# Patient Record
Sex: FEMALE | Race: ASIAN | NOT HISPANIC OR LATINO
[De-identification: names, ages, dates, MRNs, and addresses within clinical notes are randomized per-mention and may not be internally consistent; named-entity substitution may affect disease eponyms.]

---

## 2021-06-21 PROBLEM — Z00.00 ENCOUNTER FOR PREVENTIVE HEALTH EXAMINATION: Status: ACTIVE | Noted: 2021-06-21

## 2021-06-22 ENCOUNTER — APPOINTMENT (OUTPATIENT)
Dept: RADIOLOGY | Facility: IMAGING CENTER | Age: 46
End: 2021-06-22

## 2021-06-22 ENCOUNTER — APPOINTMENT (OUTPATIENT)
Dept: UROGYNECOLOGY | Facility: CLINIC | Age: 46
End: 2021-06-22
Payer: MEDICAID

## 2021-06-22 ENCOUNTER — OUTPATIENT (OUTPATIENT)
Dept: OUTPATIENT SERVICES | Facility: HOSPITAL | Age: 46
LOS: 1 days | End: 2021-06-22

## 2021-06-22 ENCOUNTER — APPOINTMENT (OUTPATIENT)
Dept: MAMMOGRAPHY | Facility: IMAGING CENTER | Age: 46
End: 2021-06-22

## 2021-06-22 VITALS
SYSTOLIC BLOOD PRESSURE: 117 MMHG | BODY MASS INDEX: 31.8 KG/M2 | HEART RATE: 77 BPM | TEMPERATURE: 97.6 F | HEIGHT: 60 IN | DIASTOLIC BLOOD PRESSURE: 80 MMHG | WEIGHT: 162 LBS

## 2021-06-22 DIAGNOSIS — Z00.8 ENCOUNTER FOR OTHER GENERAL EXAMINATION: ICD-10-CM

## 2021-06-22 DIAGNOSIS — Z78.9 OTHER SPECIFIED HEALTH STATUS: ICD-10-CM

## 2021-06-22 DIAGNOSIS — R35.0 FREQUENCY OF MICTURITION: ICD-10-CM

## 2021-06-22 DIAGNOSIS — N39.46 MIXED INCONTINENCE: ICD-10-CM

## 2021-06-22 DIAGNOSIS — R39.11 HESITANCY OF MICTURITION: ICD-10-CM

## 2021-06-22 LAB
BILIRUB UR QL STRIP: NORMAL
CLARITY UR: CLEAR
COLLECTION METHOD: NORMAL
GLUCOSE UR-MCNC: NORMAL
HCG UR QL: 0.2 EU/DL
HGB UR QL STRIP.AUTO: NORMAL
KETONES UR-MCNC: NORMAL
LEUKOCYTE ESTERASE UR QL STRIP: NORMAL
NITRITE UR QL STRIP: NORMAL
PH UR STRIP: 6
PROT UR STRIP-MCNC: NORMAL
SP GR UR STRIP: 1

## 2021-06-22 PROCEDURE — 51725 SIMPLE CYSTOMETROGRAM: CPT

## 2021-06-22 PROCEDURE — 99205 OFFICE O/P NEW HI 60 MIN: CPT | Mod: 25

## 2021-06-22 PROCEDURE — 51736 URINE FLOW MEASUREMENT: CPT

## 2021-06-22 NOTE — HISTORY OF PRESENT ILLNESS
[FreeTextEntry1] : This is a 45-year-old woman, who has mild urgency, hesitancy. Her daughter translating  describes it as a problem, which only occurs 2-3 times per year. She is not describe significant retention pain or severe urgency, or any leakage. Mixed incontinence a was briefly described but as we got into detailed translation there is no leakage. \par \par Examination the general exam is normal. The external genitalia are normal. There is no evidence of pelvic organ prolapse. A urinalysis was obtained and was unrevealing. As the exam and urinalysis, noncontributory since the stomach was performed and demonstrated first sensation at 50 cc urgency at 150 cc capacity of 350 cc. Mild sensory urgency was noted, but no distinct bladder contractions were appreciated. Cough stress testing was negative\par \par My impression that this patient has extremely rare sensation of the urge to go to the bathroom and a few times a year and has not produced any urine at these were aspirated. She shows no signs or symptoms of hypertension at the present time, and I have reassured her and occasional sensation of the urge to void, as she described it, in my opinion, is not represent medical condition in need of treatment and should not be alarming.  It is clinically not of significant other to the patient. Signs and symptoms of significant urgency, frequency disorder, as well as signs and symptoms of straining or hesitancy WERE reviewed and the detailed parameters for which to come for additional intervention were reviewed.

## 2021-06-23 PROBLEM — Z78.9 NON-SMOKER: Status: ACTIVE | Noted: 2021-06-23

## 2021-11-22 ENCOUNTER — APPOINTMENT (OUTPATIENT)
Dept: CT IMAGING | Facility: IMAGING CENTER | Age: 46
End: 2021-11-22
Payer: MEDICAID

## 2021-11-22 ENCOUNTER — OUTPATIENT (OUTPATIENT)
Dept: OUTPATIENT SERVICES | Facility: HOSPITAL | Age: 46
LOS: 1 days | End: 2021-11-22
Payer: MEDICAID

## 2021-11-22 DIAGNOSIS — H90.A31 MIXED CONDUCTIVE AND SENSORINEURAL HEARING LOSS, UNILATERAL, RIGHT EAR WITH RESTRICTED HEARING ON THE CONTRALATERAL SIDE: ICD-10-CM

## 2021-11-22 PROCEDURE — 70480 CT ORBIT/EAR/FOSSA W/O DYE: CPT | Mod: 26

## 2021-11-22 PROCEDURE — 70480 CT ORBIT/EAR/FOSSA W/O DYE: CPT

## 2021-12-28 ENCOUNTER — EMERGENCY (EMERGENCY)
Facility: HOSPITAL | Age: 46
LOS: 1 days | Discharge: HOME | End: 2021-12-28
Attending: EMERGENCY MEDICINE | Admitting: EMERGENCY MEDICINE
Payer: MEDICAID

## 2021-12-28 VITALS
HEART RATE: 100 BPM | OXYGEN SATURATION: 100 % | SYSTOLIC BLOOD PRESSURE: 107 MMHG | TEMPERATURE: 99 F | RESPIRATION RATE: 18 BRPM | DIASTOLIC BLOOD PRESSURE: 75 MMHG

## 2021-12-28 PROCEDURE — 99282 EMERGENCY DEPT VISIT SF MDM: CPT

## 2021-12-28 NOTE — ED PROVIDER NOTE - NSFOLLOWUPINSTRUCTIONS_ED_ALL_ED_FT
Follow up with your Primary Medical Doctor.  Rest.  Drink plenty of fluids.  Take Tylenol 650mg orally every 6 hours as needed for fever.  Self quarantine as discussed based on CDC guidelines.  It is very important to be diligent about hand washing & hygiene to avoid spreading the illness to others.  Return to the ER for any persistent/worsening or new symptoms chest pain, shortness of breath, unable to eat/drink high fever or any concerning symptoms.  If you test positive: 10 DAY SELF-QUARANTINE PERIOD WAS RECOMMENDED TO YOU AS PART OF YOUR CARE:  FOR A 10 DAY PERIOD:    Stay inside your home as much as possible, avoiding public places or public interaction.     Do not go to work. If you do enter any public domain, at minimum wear a surgical mask at all times.     Even while indoors, attempt to remain isolated from other individuals such as family or friends, as much as possible.     Return to the emergency room for any symptoms such as worsening shortness of breath, significant worsening cough, high fevers despite antipyretics, or severe weakness/malaise

## 2021-12-28 NOTE — ED PROVIDER NOTE - OBJECTIVE STATEMENT
46 yof with  positive covid exposure at home from spouse. Source tested positive 2 days ago. Patient has no chest pain, SOB, cough or other concerns. Had headache earlier that resolved with OTC meds.

## 2021-12-28 NOTE — ED PROVIDER NOTE - PATIENT PORTAL LINK FT
You can access the FollowMyHealth Patient Portal offered by [facility] by registering at the following website: [email or web address]. By joining [email or web address]’s FollowMyHealth portal, you will also be able to view your health information using other applications (apps) compatible with our system.

## 2021-12-29 LAB — SARS-COV-2 RNA SPEC QL NAA+PROBE: DETECTED

## 2022-04-19 PROBLEM — Z78.9 OTHER SPECIFIED HEALTH STATUS: Chronic | Status: ACTIVE | Noted: 2021-12-28

## 2022-08-05 ENCOUNTER — APPOINTMENT (OUTPATIENT)
Dept: OTOLARYNGOLOGY | Facility: CLINIC | Age: 47
End: 2022-08-05

## 2022-10-11 ENCOUNTER — APPOINTMENT (OUTPATIENT)
Dept: PULMONOLOGY | Facility: CLINIC | Age: 47
End: 2022-10-11

## 2022-10-11 ENCOUNTER — NON-APPOINTMENT (OUTPATIENT)
Age: 47
End: 2022-10-11

## 2022-10-11 VITALS — HEART RATE: 63 BPM | DIASTOLIC BLOOD PRESSURE: 82 MMHG | OXYGEN SATURATION: 99 % | SYSTOLIC BLOOD PRESSURE: 119 MMHG

## 2022-10-11 DIAGNOSIS — R05.3 CHRONIC COUGH: ICD-10-CM

## 2022-10-11 LAB — POCT - HEMOGLOBIN (HGB), QUANTITATIVE, TRANSCUTANEOUS: 14.5

## 2022-10-11 PROCEDURE — 88738 HGB QUANT TRANSCUTANEOUS: CPT

## 2022-10-11 PROCEDURE — 99204 OFFICE O/P NEW MOD 45 MIN: CPT | Mod: 25

## 2022-10-11 PROCEDURE — 94727 GAS DIL/WSHOT DETER LNG VOL: CPT

## 2022-10-11 PROCEDURE — ZZZZZ: CPT

## 2022-10-11 PROCEDURE — 94010 BREATHING CAPACITY TEST: CPT

## 2022-10-11 PROCEDURE — 94729 DIFFUSING CAPACITY: CPT

## 2022-10-11 PROCEDURE — 71046 X-RAY EXAM CHEST 2 VIEWS: CPT

## 2022-10-11 RX ORDER — ALBUTEROL SULFATE 90 UG/1
108 (90 BASE) INHALANT RESPIRATORY (INHALATION)
Qty: 1 | Refills: 1 | Status: ACTIVE | COMMUNITY
Start: 2022-10-11 | End: 1900-01-01

## 2022-10-11 RX ORDER — MONTELUKAST 10 MG/1
10 TABLET, FILM COATED ORAL
Qty: 30 | Refills: 2 | Status: ACTIVE | COMMUNITY
Start: 2022-10-11 | End: 1900-01-01

## 2022-10-11 NOTE — PROCEDURE
[FreeTextEntry1] : PFT: Normal spirometry.  Lung volumes normal. DLCO normal.\par \par CXR: clear lungs, no focal consolidation or pleural effusions, cardiac silhouette appears normal.  No bony abnormality.\par

## 2022-10-11 NOTE — HISTORY OF PRESENT ILLNESS
[Never] : never [TextBox_4] : 47F no pmhx, no meds\par \par reports cough on and off for about 1.5 years\par notices it more around her period\par no blood\par some sputum\par triggers can be perfume/strong smells\par no associated wheeze or shortness of breath\par saw ENT was told sinuses are clear\par denies gerd\par no inhaler use in past \par has not tried any allergy pills\par cough does not happen daily, sometimes only weekly.

## 2023-08-22 NOTE — ASSESSMENT
[FreeTextEntry1] : trial of singulair and prn albuterol \par reassess 2 weeks\par \par Total encounter time 30 minutes.\par 
normal...

## 2023-12-26 ENCOUNTER — EMERGENCY (EMERGENCY)
Facility: HOSPITAL | Age: 48
LOS: 1 days | Discharge: HOME | End: 2023-12-26
Admitting: STUDENT IN AN ORGANIZED HEALTH CARE EDUCATION/TRAINING PROGRAM
Payer: MEDICAID

## 2023-12-26 VITALS
DIASTOLIC BLOOD PRESSURE: 91 MMHG | RESPIRATION RATE: 18 BRPM | HEART RATE: 72 BPM | TEMPERATURE: 98 F | OXYGEN SATURATION: 100 % | SYSTOLIC BLOOD PRESSURE: 143 MMHG

## 2023-12-26 PROCEDURE — 99284 EMERGENCY DEPT VISIT MOD MDM: CPT | Mod: 25

## 2023-12-27 LAB
ALBUMIN SERPL ELPH-MCNC: 4.2 G/DL — SIGNIFICANT CHANGE UP (ref 3.3–5)
ALBUMIN SERPL ELPH-MCNC: 4.2 G/DL — SIGNIFICANT CHANGE UP (ref 3.3–5)
ALP SERPL-CCNC: 66 U/L — SIGNIFICANT CHANGE UP (ref 40–120)
ALP SERPL-CCNC: 66 U/L — SIGNIFICANT CHANGE UP (ref 40–120)
ALT FLD-CCNC: 11 U/L — SIGNIFICANT CHANGE UP (ref 4–33)
ALT FLD-CCNC: 11 U/L — SIGNIFICANT CHANGE UP (ref 4–33)
ANION GAP SERPL CALC-SCNC: 13 MMOL/L — SIGNIFICANT CHANGE UP (ref 7–14)
ANION GAP SERPL CALC-SCNC: 13 MMOL/L — SIGNIFICANT CHANGE UP (ref 7–14)
AST SERPL-CCNC: 14 U/L — SIGNIFICANT CHANGE UP (ref 4–32)
AST SERPL-CCNC: 14 U/L — SIGNIFICANT CHANGE UP (ref 4–32)
BASOPHILS # BLD AUTO: 0.06 K/UL — SIGNIFICANT CHANGE UP (ref 0–0.2)
BASOPHILS # BLD AUTO: 0.06 K/UL — SIGNIFICANT CHANGE UP (ref 0–0.2)
BASOPHILS NFR BLD AUTO: 0.6 % — SIGNIFICANT CHANGE UP (ref 0–2)
BASOPHILS NFR BLD AUTO: 0.6 % — SIGNIFICANT CHANGE UP (ref 0–2)
BILIRUB SERPL-MCNC: <0.2 MG/DL — SIGNIFICANT CHANGE UP (ref 0.2–1.2)
BILIRUB SERPL-MCNC: <0.2 MG/DL — SIGNIFICANT CHANGE UP (ref 0.2–1.2)
BUN SERPL-MCNC: 10 MG/DL — SIGNIFICANT CHANGE UP (ref 7–23)
BUN SERPL-MCNC: 10 MG/DL — SIGNIFICANT CHANGE UP (ref 7–23)
CALCIUM SERPL-MCNC: 9.2 MG/DL — SIGNIFICANT CHANGE UP (ref 8.4–10.5)
CALCIUM SERPL-MCNC: 9.2 MG/DL — SIGNIFICANT CHANGE UP (ref 8.4–10.5)
CHLORIDE SERPL-SCNC: 104 MMOL/L — SIGNIFICANT CHANGE UP (ref 98–107)
CHLORIDE SERPL-SCNC: 104 MMOL/L — SIGNIFICANT CHANGE UP (ref 98–107)
CO2 SERPL-SCNC: 22 MMOL/L — SIGNIFICANT CHANGE UP (ref 22–31)
CO2 SERPL-SCNC: 22 MMOL/L — SIGNIFICANT CHANGE UP (ref 22–31)
CREAT SERPL-MCNC: 0.74 MG/DL — SIGNIFICANT CHANGE UP (ref 0.5–1.3)
CREAT SERPL-MCNC: 0.74 MG/DL — SIGNIFICANT CHANGE UP (ref 0.5–1.3)
EGFR: 100 ML/MIN/1.73M2 — SIGNIFICANT CHANGE UP
EGFR: 100 ML/MIN/1.73M2 — SIGNIFICANT CHANGE UP
EOSINOPHIL # BLD AUTO: 0.23 K/UL — SIGNIFICANT CHANGE UP (ref 0–0.5)
EOSINOPHIL # BLD AUTO: 0.23 K/UL — SIGNIFICANT CHANGE UP (ref 0–0.5)
EOSINOPHIL NFR BLD AUTO: 2.4 % — SIGNIFICANT CHANGE UP (ref 0–6)
EOSINOPHIL NFR BLD AUTO: 2.4 % — SIGNIFICANT CHANGE UP (ref 0–6)
GLUCOSE SERPL-MCNC: 99 MG/DL — SIGNIFICANT CHANGE UP (ref 70–99)
GLUCOSE SERPL-MCNC: 99 MG/DL — SIGNIFICANT CHANGE UP (ref 70–99)
HCT VFR BLD CALC: 38.7 % — SIGNIFICANT CHANGE UP (ref 34.5–45)
HCT VFR BLD CALC: 38.7 % — SIGNIFICANT CHANGE UP (ref 34.5–45)
HGB BLD-MCNC: 12.3 G/DL — SIGNIFICANT CHANGE UP (ref 11.5–15.5)
HGB BLD-MCNC: 12.3 G/DL — SIGNIFICANT CHANGE UP (ref 11.5–15.5)
IANC: 5.66 K/UL — SIGNIFICANT CHANGE UP (ref 1.8–7.4)
IANC: 5.66 K/UL — SIGNIFICANT CHANGE UP (ref 1.8–7.4)
IMM GRANULOCYTES NFR BLD AUTO: 0.4 % — SIGNIFICANT CHANGE UP (ref 0–0.9)
IMM GRANULOCYTES NFR BLD AUTO: 0.4 % — SIGNIFICANT CHANGE UP (ref 0–0.9)
LYMPHOCYTES # BLD AUTO: 2.92 K/UL — SIGNIFICANT CHANGE UP (ref 1–3.3)
LYMPHOCYTES # BLD AUTO: 2.92 K/UL — SIGNIFICANT CHANGE UP (ref 1–3.3)
LYMPHOCYTES # BLD AUTO: 30.5 % — SIGNIFICANT CHANGE UP (ref 13–44)
LYMPHOCYTES # BLD AUTO: 30.5 % — SIGNIFICANT CHANGE UP (ref 13–44)
MCHC RBC-ENTMCNC: 25.2 PG — LOW (ref 27–34)
MCHC RBC-ENTMCNC: 25.2 PG — LOW (ref 27–34)
MCHC RBC-ENTMCNC: 31.8 GM/DL — LOW (ref 32–36)
MCHC RBC-ENTMCNC: 31.8 GM/DL — LOW (ref 32–36)
MCV RBC AUTO: 79.1 FL — LOW (ref 80–100)
MCV RBC AUTO: 79.1 FL — LOW (ref 80–100)
MONOCYTES # BLD AUTO: 0.65 K/UL — SIGNIFICANT CHANGE UP (ref 0–0.9)
MONOCYTES # BLD AUTO: 0.65 K/UL — SIGNIFICANT CHANGE UP (ref 0–0.9)
MONOCYTES NFR BLD AUTO: 6.8 % — SIGNIFICANT CHANGE UP (ref 2–14)
MONOCYTES NFR BLD AUTO: 6.8 % — SIGNIFICANT CHANGE UP (ref 2–14)
NEUTROPHILS # BLD AUTO: 5.66 K/UL — SIGNIFICANT CHANGE UP (ref 1.8–7.4)
NEUTROPHILS # BLD AUTO: 5.66 K/UL — SIGNIFICANT CHANGE UP (ref 1.8–7.4)
NEUTROPHILS NFR BLD AUTO: 59.3 % — SIGNIFICANT CHANGE UP (ref 43–77)
NEUTROPHILS NFR BLD AUTO: 59.3 % — SIGNIFICANT CHANGE UP (ref 43–77)
NRBC # BLD: 0 /100 WBCS — SIGNIFICANT CHANGE UP (ref 0–0)
NRBC # BLD: 0 /100 WBCS — SIGNIFICANT CHANGE UP (ref 0–0)
NRBC # FLD: 0 K/UL — SIGNIFICANT CHANGE UP (ref 0–0)
NRBC # FLD: 0 K/UL — SIGNIFICANT CHANGE UP (ref 0–0)
PLATELET # BLD AUTO: 343 K/UL — SIGNIFICANT CHANGE UP (ref 150–400)
PLATELET # BLD AUTO: 343 K/UL — SIGNIFICANT CHANGE UP (ref 150–400)
POTASSIUM SERPL-MCNC: 4 MMOL/L — SIGNIFICANT CHANGE UP (ref 3.5–5.3)
POTASSIUM SERPL-MCNC: 4 MMOL/L — SIGNIFICANT CHANGE UP (ref 3.5–5.3)
POTASSIUM SERPL-SCNC: 4 MMOL/L — SIGNIFICANT CHANGE UP (ref 3.5–5.3)
POTASSIUM SERPL-SCNC: 4 MMOL/L — SIGNIFICANT CHANGE UP (ref 3.5–5.3)
PROT SERPL-MCNC: 8.1 G/DL — SIGNIFICANT CHANGE UP (ref 6–8.3)
PROT SERPL-MCNC: 8.1 G/DL — SIGNIFICANT CHANGE UP (ref 6–8.3)
RBC # BLD: 4.89 M/UL — SIGNIFICANT CHANGE UP (ref 3.8–5.2)
RBC # BLD: 4.89 M/UL — SIGNIFICANT CHANGE UP (ref 3.8–5.2)
RBC # FLD: 14.6 % — HIGH (ref 10.3–14.5)
RBC # FLD: 14.6 % — HIGH (ref 10.3–14.5)
SODIUM SERPL-SCNC: 139 MMOL/L — SIGNIFICANT CHANGE UP (ref 135–145)
SODIUM SERPL-SCNC: 139 MMOL/L — SIGNIFICANT CHANGE UP (ref 135–145)
WBC # BLD: 9.56 K/UL — SIGNIFICANT CHANGE UP (ref 3.8–10.5)
WBC # BLD: 9.56 K/UL — SIGNIFICANT CHANGE UP (ref 3.8–10.5)
WBC # FLD AUTO: 9.56 K/UL — SIGNIFICANT CHANGE UP (ref 3.8–10.5)
WBC # FLD AUTO: 9.56 K/UL — SIGNIFICANT CHANGE UP (ref 3.8–10.5)

## 2023-12-27 PROCEDURE — 70450 CT HEAD/BRAIN W/O DYE: CPT | Mod: 26,MA

## 2023-12-27 RX ORDER — MECLIZINE HCL 12.5 MG
1 TABLET ORAL
Qty: 15 | Refills: 0
Start: 2023-12-27 | End: 2023-12-31

## 2023-12-27 RX ORDER — MECLIZINE HCL 12.5 MG
25 TABLET ORAL ONCE
Refills: 0 | Status: COMPLETED | OUTPATIENT
Start: 2023-12-27 | End: 2023-12-27

## 2023-12-27 RX ORDER — SODIUM CHLORIDE 9 MG/ML
1000 INJECTION INTRAMUSCULAR; INTRAVENOUS; SUBCUTANEOUS ONCE
Refills: 0 | Status: COMPLETED | OUTPATIENT
Start: 2023-12-27 | End: 2023-12-27

## 2023-12-27 RX ADMIN — Medication 25 MILLIGRAM(S): at 03:50

## 2023-12-27 RX ADMIN — SODIUM CHLORIDE 1000 MILLILITER(S): 9 INJECTION INTRAMUSCULAR; INTRAVENOUS; SUBCUTANEOUS at 03:50

## 2023-12-27 NOTE — ED PROVIDER NOTE - PATIENT PORTAL LINK FT
You can access the FollowMyHealth Patient Portal offered by [facility] by registering at the following website: [email or web address]. By joining [employer]’s FollowMyHealth portal, you will also be able to view your health information using other applications (apps) compatible with our system. You can access the FollowMyHealth Patient Portal offered by [facility] by registering at the following website: [email or web address]. By joining [employer]’s FollowMyHealth portal, you will also be able to view your health information using other applications (apps) compatible with our system.

## 2023-12-27 NOTE — ED ADULT NURSE NOTE - NSFALLUNIVINTERV_ED_ALL_ED
Bed/Stretcher in lowest position, wheels locked, appropriate side rails in place/Call bell, personal items and telephone in reach/Instruct patient to call for assistance before getting out of bed/chair/stretcher/Non-slip footwear applied when patient is off stretcher/[place] to call system/Physically safe environment - no spills, clutter or unnecessary equipment/Purposeful proactive rounding/Room/bathroom lighting operational, light cord in reach Bed/Stretcher in lowest position, wheels locked, appropriate side rails in place/Call bell, personal items and telephone in reach/Instruct patient to call for assistance before getting out of bed/chair/stretcher/Non-slip footwear applied when patient is off stretcher/[place] to call system/Physically safe environment - no spills, clutter or unnecessary equipment/Purposeful proactive rounding/Room/bathroom lighting operational, light cord in reach

## 2023-12-27 NOTE — ED ADULT NURSE NOTE - OBJECTIVE STATEMENT
Pt received in intake 3. Pt alert and oriented x 4, ambulatory at baseline. Pt denies pertinent medical history. Pt c/o dizziness, headache, and N/V that began 2 days ago. Pt denies nausea and headache at this time. Respirations even and unlabored, chest rise equal bilaterally. Pt denies chest pain, shortness of breath, N/V/D, headache, weakness, fever, chills,  symptoms. 20g IV in the left AC, labs drawn and pt medicated per MD orders.

## 2023-12-27 NOTE — ED PROVIDER NOTE - PROGRESS NOTE DETAILS
dizziness resolved with meclizine. Labs unremarkable. CT imaging negative. Plan to follow up with ENT.

## 2023-12-27 NOTE — ED PROVIDER NOTE - ENMT, MLM
Airway patent, Nasal mucosa clear. Mouth with normal mucosa. Throat has no vesicles, no oropharyngeal exudates and uvula is midline.  ruptured TM - R ear

## 2023-12-27 NOTE — ED PROVIDER NOTE - CLINICAL SUMMARY MEDICAL DECISION MAKING FREE TEXT BOX
46-year-old female no reported past medical history presents with room spinning dizziness intermittently x 2 day.  patient reports room spinning dizziness occurs when sitting up from lying position or quick neck movement.  Dizziness better with rest.  Patient states she currently has a ruptured TM patient follows up with ENT for.  States she has had a ruptured TM for over a year, has refused surgical repair of TM.  Patient states she had episode of emesis today secondary to dizziness.  Patient without dizziness occurring because she is sitting still.  Patient denies fevers chills vision change motor or sensory deficit gait instability.  No chest pain shortness of breath.    dizziness likely BPPV, vertigo possibly from ruptured TM   will obtain CT head to r/o mass or ICH however unlikely  low suspicion CVA   will give meclizine for symptom relief  check labs for anemia or electrolyte disturbance.

## 2023-12-27 NOTE — ED PROVIDER NOTE - OBJECTIVE STATEMENT
46-year-old female no reported past medical history presents with room spinning dizziness intermittently x 2 day.  patient reports room spinning dizziness occurs when sitting up from lying position or quick neck movement.  Dizziness better with rest.  Patient states she currently has a ruptured TM patient follows up with ENT for.  States she has had a ruptured TM for over a year, has refused surgical repair of TM.  Patient states she had episode of emesis today secondary to dizziness.  Patient without dizziness occurring because she is sitting still.  Patient denies fevers chills vision change motor or sensory deficit gait instability.  No chest pain shortness of breath.

## 2023-12-27 NOTE — ED PROVIDER NOTE - NS ED ATTENDING NAME FT
Pt presents from home via EMS for evaluation of unwitnessed fall just pta.  Pt's family member heard pt yell and found pt lying on ground.  Pt confused at baseline orientation.  EMS reported pt was c/o bilateral hip pain pta, but pt denies any pain at this time.  No shortening or external rotation noted in lower extremities.  ROM intact.  No obvious injuries noted or signs of pain upon palpation.     Kimberly

## 2025-04-19 ENCOUNTER — NON-APPOINTMENT (OUTPATIENT)
Age: 50
End: 2025-04-19

## 2025-06-05 ENCOUNTER — INPATIENT (INPATIENT)
Facility: HOSPITAL | Age: 50
LOS: 4 days | Discharge: HOME | End: 2025-06-10
Attending: INTERNAL MEDICINE | Admitting: INTERNAL MEDICINE
Payer: MEDICAID

## 2025-06-05 VITALS
TEMPERATURE: 98 F | OXYGEN SATURATION: 99 % | RESPIRATION RATE: 16 BRPM | HEART RATE: 102 BPM | SYSTOLIC BLOOD PRESSURE: 159 MMHG | WEIGHT: 154.98 LBS | DIASTOLIC BLOOD PRESSURE: 83 MMHG

## 2025-06-05 LAB
ALBUMIN SERPL ELPH-MCNC: 3.6 G/DL — SIGNIFICANT CHANGE UP (ref 3.3–5)
ALP SERPL-CCNC: 79 U/L — SIGNIFICANT CHANGE UP (ref 40–120)
ALT FLD-CCNC: 6 U/L — SIGNIFICANT CHANGE UP (ref 4–33)
ANION GAP SERPL CALC-SCNC: 16 MMOL/L — HIGH (ref 7–14)
APPEARANCE UR: ABNORMAL
AST SERPL-CCNC: 8 U/L — SIGNIFICANT CHANGE UP (ref 4–32)
BASOPHILS # BLD AUTO: 0.07 K/UL — SIGNIFICANT CHANGE UP (ref 0–0.2)
BASOPHILS NFR BLD AUTO: 0.5 % — SIGNIFICANT CHANGE UP (ref 0–2)
BILIRUB SERPL-MCNC: <0.2 MG/DL — SIGNIFICANT CHANGE UP (ref 0.2–1.2)
BILIRUB UR-MCNC: NEGATIVE — SIGNIFICANT CHANGE UP
BUN SERPL-MCNC: 76 MG/DL — HIGH (ref 7–23)
CALCIUM SERPL-MCNC: 9.5 MG/DL — SIGNIFICANT CHANGE UP (ref 8.4–10.5)
CHLORIDE SERPL-SCNC: 105 MMOL/L — SIGNIFICANT CHANGE UP (ref 98–107)
CO2 SERPL-SCNC: 15 MMOL/L — LOW (ref 22–31)
COLOR SPEC: YELLOW — SIGNIFICANT CHANGE UP
CREAT SERPL-MCNC: 6.21 MG/DL — HIGH (ref 0.5–1.3)
DIFF PNL FLD: ABNORMAL
EGFR: 8 ML/MIN/1.73M2 — LOW
EGFR: 8 ML/MIN/1.73M2 — LOW
EOSINOPHIL # BLD AUTO: 0.32 K/UL — SIGNIFICANT CHANGE UP (ref 0–0.5)
EOSINOPHIL NFR BLD AUTO: 2.2 % — SIGNIFICANT CHANGE UP (ref 0–6)
GLUCOSE SERPL-MCNC: 111 MG/DL — HIGH (ref 70–99)
GLUCOSE UR QL: NEGATIVE MG/DL — SIGNIFICANT CHANGE UP
HCG SERPL-ACNC: <1 MIU/ML — SIGNIFICANT CHANGE UP
HCT VFR BLD CALC: 23.4 % — LOW (ref 34.5–45)
HGB BLD-MCNC: 7.4 G/DL — LOW (ref 11.5–15.5)
IANC: 10.06 K/UL — HIGH (ref 1.8–7.4)
IMM GRANULOCYTES NFR BLD AUTO: 1.6 % — HIGH (ref 0–0.9)
KETONES UR QL: NEGATIVE MG/DL — SIGNIFICANT CHANGE UP
LEUKOCYTE ESTERASE UR-ACNC: ABNORMAL
LYMPHOCYTES # BLD AUTO: 2.91 K/UL — SIGNIFICANT CHANGE UP (ref 1–3.3)
LYMPHOCYTES # BLD AUTO: 20 % — SIGNIFICANT CHANGE UP (ref 13–44)
MCHC RBC-ENTMCNC: 23 PG — LOW (ref 27–34)
MCHC RBC-ENTMCNC: 31.6 G/DL — LOW (ref 32–36)
MCV RBC AUTO: 72.7 FL — LOW (ref 80–100)
MONOCYTES # BLD AUTO: 0.99 K/UL — HIGH (ref 0–0.9)
MONOCYTES NFR BLD AUTO: 6.8 % — SIGNIFICANT CHANGE UP (ref 2–14)
NEUTROPHILS # BLD AUTO: 10.06 K/UL — HIGH (ref 1.8–7.4)
NEUTROPHILS NFR BLD AUTO: 68.9 % — SIGNIFICANT CHANGE UP (ref 43–77)
NITRITE UR-MCNC: NEGATIVE — SIGNIFICANT CHANGE UP
NRBC # BLD AUTO: 0 K/UL — SIGNIFICANT CHANGE UP (ref 0–0)
NRBC # FLD: 0 K/UL — SIGNIFICANT CHANGE UP (ref 0–0)
NRBC BLD AUTO-RTO: 0 /100 WBCS — SIGNIFICANT CHANGE UP (ref 0–0)
PH UR: 7 — SIGNIFICANT CHANGE UP (ref 5–8)
PLATELET # BLD AUTO: 543 K/UL — HIGH (ref 150–400)
POTASSIUM SERPL-MCNC: 4.7 MMOL/L — SIGNIFICANT CHANGE UP (ref 3.5–5.3)
POTASSIUM SERPL-SCNC: 4.7 MMOL/L — SIGNIFICANT CHANGE UP (ref 3.5–5.3)
PROT SERPL-MCNC: 8.6 G/DL — HIGH (ref 6–8.3)
PROT UR-MCNC: 30 MG/DL
RBC # BLD: 3.22 M/UL — LOW (ref 3.8–5.2)
RBC # FLD: 19.7 % — HIGH (ref 10.3–14.5)
SODIUM SERPL-SCNC: 136 MMOL/L — SIGNIFICANT CHANGE UP (ref 135–145)
SP GR SPEC: 1.01 — SIGNIFICANT CHANGE UP (ref 1–1.03)
UROBILINOGEN FLD QL: 0.2 MG/DL — SIGNIFICANT CHANGE UP (ref 0.2–1)
WBC # BLD: 14.58 K/UL — HIGH (ref 3.8–10.5)
WBC # FLD AUTO: 14.58 K/UL — HIGH (ref 3.8–10.5)

## 2025-06-05 PROCEDURE — 99285 EMERGENCY DEPT VISIT HI MDM: CPT

## 2025-06-05 PROCEDURE — 76830 TRANSVAGINAL US NON-OB: CPT | Mod: 26

## 2025-06-05 NOTE — ED PROVIDER NOTE - CLINICAL SUMMARY MEDICAL DECISION MAKING FREE TEXT BOX
46 yo female with complaints of increasing pelvic pain s/p fibroid diagnosis.     Plan: labs, TVUS, pain control. Discussed that it was not certain to receive surgery tonight, however I would obtain the data and present her case to the GYN team, patient and family amenable to that plan. 50 yo female with complaints of increasing pelvic pain s/p fibroid diagnosis.     Plan: labs, TVUS, pain control. Discussed that it was not certain to receive surgery tonight, however I would obtain the data and present her case to the GYN team, patient and family amenable to that plan.

## 2025-06-05 NOTE — ED PROVIDER NOTE - PHYSICAL EXAMINATION
Constitutional: NAD AAOx3  Eyes: EOMI, pupils equal  Head: Normocephalic atraumatic  Mouth: no airway obstruction  Cardiac: regular rate   Resp: Lungs CTAB  GI: Abd s/nd, + tenderness over suprapubic area and left pelvis, no CVAT.   Neuro: CN2-12 intact  Skin: No rashes

## 2025-06-05 NOTE — ED PROVIDER NOTE - PROGRESS NOTE DETAILS
He can do fasting labs when he comes in and call me Wednesday for the results to discuss. Labs ordered if he wants to do them prior.    CLAU Heredia Addendum----This patient had been signed out to me by NP Joi Cuellar, pending labs and TVUS imaging.  In the interim, pt objectively noted to be resting comfortably; pt has been clinically stable; no issues thus far.  Lab and TVUS results reviewed in full, with multiple findings noted.  Pt with WBC 14.58, Hb 7.4, Hct 23.4, platelets 543.  CMP: bicarb 16, AG 16, BUN 76, cr 6.21, GFR 8.  HCG <1.0.  UA with large leuk, 499 WBC, moderate blood.  TVUS showed: "...IMPRESSION: Fibroid uterus. Unremarkable right ovary. Left ovary not visualized.".  I went to reassess pt via [facility] [employer]  ID# [id number], but, via , pt refused  services and stated she wanted her son at bedside to translate.  All test results discussed with pt's son who translated to pt.  Both pt and her son deny history of anemia, renal disease, abnormal kidney lab tests in the past.  Of [facility]; last labs from 2023 show normal BUN/cr.  Given the above findings, I presented this patient's case to ED attending Dr. Posadas.  Pt will need to be admitted to the inpatient service for further evaluation given the above findings; pt and pt's son verbalize agreement to inpatient medical admission for further management.  Pt was started on IV ceftriaxone for UTI; UCX is currently testing in the lab.  Pt follows with PMD Dr. Gilmer Álvarez.  I called his cell twice (left vm message on first call), with NR on either attempt.  I then called his service at 0104 hrs; service put out call to Dr. Álvarez who rep stated is on call for himself -----> NR.  I called service back at 0134 hrs; they put out a second call ------> NR.  I called again at 0154 hrs; rep tried his cell directly as well as his home # directly, with NR at either # while I held on the phone.  At this time, I will admit to hospitalist service as more than reasonable # of attempts have been made to reach PMD, with no response thus far.  ED attending Dr. Posadas was communicated on the above progress as it occurred.  Pt is stable at this time. CLAU Heredia Addendum-----I spoke with hospitalist Dr. Montejo who states that [facility] admits to Dr. Kevin Campbell.  I spoke with Dr. Campbell who accepted pt to his service for further management. CLAU Heredia Addendum----This patient had been signed out to me by BILLIE Cuellar, pending labs and TVUS imaging.  In the interim, pt objectively noted to be resting comfortably; pt has been clinically stable; no issues thus far.  Lab and TVUS results reviewed in full, with multiple findings noted.  Pt with WBC 14.58, Hb 7.4, Hct 23.4, platelets 543.  CMP: bicarb 16, AG 16, BUN 76, cr 6.21, GFR 8.  HCG <1.0.  UA with large leuk, 499 WBC, moderate blood.  TVUS showed: "...IMPRESSION: Fibroid uterus. Unremarkable right ovary. Left ovary not visualized.".  I went to reassess pt via [facility] [employer]  ID# [id number], but, via , pt refused  services and stated she wanted her son at bedside to translate.  All test results discussed with pt's son who translated to pt.  Both pt and her son deny history of anemia, renal disease, abnormal kidney lab tests in the past.  Of note, this writer [facility]; last labs from 2023 show normal BUN/cr.  Given the above findings, I presented this patient's case to ED attending Dr. Posadas.  Pt will need to be admitted to the inpatient service for further evaluation given the above findings; pt and pt's son verbalize agreement to inpatient medical admission for further management.  Pt was started on IV ceftriaxone for UTI; UCX is currently testing in the lab.  Pt's son states pt follows with PMD Dr. Gilmer Álvarez.  I called his cell twice (left vm message on first call), with NR on either attempt.  I then called his service at 0104 hrs; service put out call to Dr. Álvarez who rep stated is on call for himself -----> NR.  I called service back at 0134 hrs; they put out a second call ------> NR.  I called again at 0154 hrs; rep tried his cell directly as well as his home # directly, with NR at either # while I held on the phone.  At this time, I will admit to hospitalist service as more than reasonable # of attempts have been made to reach PMD, with no response thus far.  ED attending Dr. Posadas was communicated on the above progress as it occurred.  Pt is stable at this time. CLAU Heredia Addendum-----I spoke with hospitalist Dr. Montejo who [facility]  I spoke with Dr. Campbell who accepted pt to his service for further management.  Kidney/Bladder US radiology report states: "...FINDINGS:  Right kidney: 10.4 cm. Mild to moderate right hydronephrosis. 1.4 x 1.5 x   0.6 cm lower pole stone. 1.2 x 1.2 x 1.1 cm interpolar stone. 1.5 x 1.2 x   0.6 cm upper pole stone.    Left kidney: 11.3 cm. No hydronephrosis. 1.2 x 0.6 cm lower pole stone.   1.2 x 1.5 x 0.9 cm interpolar stone. 1.2 x 1.4 x 0.7 cm upper pole stone.    Urinary bladder: Within normal limits.    IMPRESSION:  Mild to moderate right hydronephrosis. Bilateral renal stones as above."  The above US results were verbally relayed to Dr. Campbell.

## 2025-06-05 NOTE — ED ADULT NURSE NOTE - OBJECTIVE STATEMENT
patient came to Er with complaints of pelvic pain . asper patient she was seen by  Gyn and was told that she has fibroids. axox4. denies any other medical problems. ultrasound done. left ac 20g iv placed. labs sent.

## 2025-06-05 NOTE — ED ADULT TRIAGE NOTE - CHIEF COMPLAINT QUOTE
Pt c/o pelvic pain since march, was recently diagnosed with fibroids. Comes to ER d/t worsening pain and nausea. Denies fever, vomiting, chest pain, vaginal bleeding.

## 2025-06-05 NOTE — ED PROVIDER NOTE - CARE PLAN
1 Principal Discharge DX:	Pelvic pain  Secondary Diagnosis:	Elevated BUN  Secondary Diagnosis:	Elevated serum creatinine  Secondary Diagnosis:	Anemia  Secondary Diagnosis:	UTI (urinary tract infection)

## 2025-06-05 NOTE — ED PROVIDER NOTE - ATTENDING APP SHARED VISIT CONTRIBUTION OF CARE
I personally made the management plan, and take responsibility for the patient's management. I have discussed with and reviewed the NP's note and agree with the History, ROS, Physical Exam and MDM unless otherwise indicated. A brief summary of my personal evaluation and impression can be found below.    Pt feliz speaking, requests for son to interpret, declined free interpretation services    49-year-old female initially evaluated by ACP team in the ED found to have an elevated creatinine on lab work according to son patient has not previously had kidney disease.  On review of HIE patient previously had normal creatinine in 2023.  Today found to be 6.21.  She also has a urinary tract infection based off urinalysis.  An elevation in her white blood cell count to 14.58.  Patient is Feliz speaking requesting for her  to interpret.  Patient had a transvaginal ultrasound performed prior to my assessment transvaginal ultrasound showing fibroid uterus for which she has been following up outpatient to get surgery.  Today on lab work also found to be anemic, denies any history of anemia. Mother brought in because she was experiencing lower abdominal pain she describes it pain kind of bandlike her lower abdomen.  Has an ultrasound showing multiple fibroids from outpatient.  Reports that she last had lab work performed a year ago which was normal.  She reports a normal amount of urine output.    General: Well-appearing, NAD  Head: Atraumatic, normocephalic  Eyes: EOM grossly in tact, no scleral icterus  ENT: moist mucous membranes  Neurology: A&Ox3  Respiratory: normal respiratory effort  CV: Extremities warm and well perfused  Abdominal: Soft, non-distended, mild suprapubic ttp  Extremities: No edema  Skin: No rashes    Patient will require admission for workup of ROMAN.  Ordered ultrasound to evaluate for possible obstruction.  May be secondary to stones versus fibroids versus other.  Unlikely that her urinary tract infection would have caused this level of renal dysfunction.  Will order ceftriaxone.  Patient to require admission to the hospital.  pt and family aware of plan and amenable.  No acute findings on labs that would require immediate nephrology consult, can be performed inpatient on a nonurgent basis.

## 2025-06-05 NOTE — ED PROVIDER NOTE - OBJECTIVE STATEMENT
48 yo female with no PMHx recently, yesterday diagnosed with 3 large uterine fibroids. Patient was seen by her GYN as an outpatient due to increasing pelvic pain, official US showed 3 large fibroids. Patient denies active bleeding LMP 05/25/25, resolved. Patient was told she needs surgery and has appointment on sunday for surgical evaluation, however the pain is increasing and she decided to come to ER. Patient has been taking IBU without relief. Offered [facility] , patient wanted adult son to translate.

## 2025-06-06 DIAGNOSIS — N17.9 ACUTE KIDNEY FAILURE, UNSPECIFIED: ICD-10-CM

## 2025-06-06 DIAGNOSIS — I10 ESSENTIAL (PRIMARY) HYPERTENSION: ICD-10-CM

## 2025-06-06 DIAGNOSIS — R10.2 PELVIC AND PERINEAL PAIN: ICD-10-CM

## 2025-06-06 DIAGNOSIS — A41.50 GRAM-NEGATIVE SEPSIS, UNSPECIFIED: ICD-10-CM

## 2025-06-06 DIAGNOSIS — D64.9 ANEMIA, UNSPECIFIED: ICD-10-CM

## 2025-06-06 LAB
ANION GAP SERPL CALC-SCNC: 15 MMOL/L — HIGH (ref 7–14)
BUN SERPL-MCNC: 73 MG/DL — HIGH (ref 7–23)
CALCIUM SERPL-MCNC: 9.4 MG/DL — SIGNIFICANT CHANGE UP (ref 8.4–10.5)
CHLORIDE SERPL-SCNC: 107 MMOL/L — SIGNIFICANT CHANGE UP (ref 98–107)
CO2 SERPL-SCNC: 15 MMOL/L — LOW (ref 22–31)
CREAT SERPL-MCNC: 5.73 MG/DL — HIGH (ref 0.5–1.3)
EGFR: 9 ML/MIN/1.73M2 — LOW
EGFR: 9 ML/MIN/1.73M2 — LOW
FERRITIN SERPL-MCNC: 361 NG/ML — HIGH (ref 15–150)
GLUCOSE SERPL-MCNC: 84 MG/DL — SIGNIFICANT CHANGE UP (ref 70–99)
HCT VFR BLD CALC: 25.9 % — LOW (ref 34.5–45)
HGB BLD-MCNC: 7.8 G/DL — LOW (ref 11.5–15.5)
IRON SATN MFR SERPL: 16 UG/DL — LOW (ref 30–160)
IRON SATN MFR SERPL: 6 % — LOW (ref 14–50)
MCHC RBC-ENTMCNC: 22.5 PG — LOW (ref 27–34)
MCHC RBC-ENTMCNC: 30.1 G/DL — LOW (ref 32–36)
MCV RBC AUTO: 74.6 FL — LOW (ref 80–100)
NRBC # BLD AUTO: 0 K/UL — SIGNIFICANT CHANGE UP (ref 0–0)
NRBC # FLD: 0 K/UL — SIGNIFICANT CHANGE UP (ref 0–0)
NRBC BLD AUTO-RTO: 0 /100 WBCS — SIGNIFICANT CHANGE UP (ref 0–0)
PLATELET # BLD AUTO: 593 K/UL — HIGH (ref 150–400)
POTASSIUM SERPL-MCNC: 5.2 MMOL/L — SIGNIFICANT CHANGE UP (ref 3.5–5.3)
POTASSIUM SERPL-SCNC: 5.2 MMOL/L — SIGNIFICANT CHANGE UP (ref 3.5–5.3)
RBC # BLD: 3.47 M/UL — LOW (ref 3.8–5.2)
RBC # FLD: 19.9 % — HIGH (ref 10.3–14.5)
SODIUM SERPL-SCNC: 137 MMOL/L — SIGNIFICANT CHANGE UP (ref 135–145)
TIBC SERPL-MCNC: 247 UG/DL — SIGNIFICANT CHANGE UP (ref 220–430)
UIBC SERPL-MCNC: 231 UG/DL — SIGNIFICANT CHANGE UP (ref 110–370)
VIT B12 SERPL-MCNC: 413 PG/ML — SIGNIFICANT CHANGE UP (ref 200–900)
WBC # BLD: 14.49 K/UL — HIGH (ref 3.8–10.5)
WBC # FLD AUTO: 14.49 K/UL — HIGH (ref 3.8–10.5)

## 2025-06-06 PROCEDURE — 76770 US EXAM ABDO BACK WALL COMP: CPT | Mod: 26

## 2025-06-06 RX ORDER — AMLODIPINE BESYLATE 10 MG/1
1 TABLET ORAL
Refills: 0 | DISCHARGE

## 2025-06-06 RX ORDER — ACETAMINOPHEN 500 MG/5ML
650 LIQUID (ML) ORAL ONCE
Refills: 0 | Status: COMPLETED | OUTPATIENT
Start: 2025-06-06 | End: 2025-06-06

## 2025-06-06 RX ORDER — CEFTRIAXONE 500 MG/1
1000 INJECTION, POWDER, FOR SOLUTION INTRAMUSCULAR; INTRAVENOUS EVERY 24 HOURS
Refills: 0 | Status: DISCONTINUED | OUTPATIENT
Start: 2025-06-06 | End: 2025-06-10

## 2025-06-06 RX ORDER — FOLIC ACID 1 MG/1
1 TABLET ORAL
Refills: 0 | DISCHARGE

## 2025-06-06 RX ORDER — FERROUS SULFATE 137(45) MG
325 TABLET, EXTENDED RELEASE ORAL
Refills: 0 | DISCHARGE

## 2025-06-06 RX ORDER — FOLIC ACID 1 MG/1
1 TABLET ORAL DAILY
Refills: 0 | Status: DISCONTINUED | OUTPATIENT
Start: 2025-06-06 | End: 2025-06-10

## 2025-06-06 RX ORDER — SODIUM CHLORIDE 9 G/1000ML
1000 INJECTION, SOLUTION INTRAVENOUS
Refills: 0 | Status: DISCONTINUED | OUTPATIENT
Start: 2025-06-06 | End: 2025-06-07

## 2025-06-06 RX ORDER — AMLODIPINE BESYLATE 10 MG/1
5 TABLET ORAL DAILY
Refills: 0 | Status: DISCONTINUED | OUTPATIENT
Start: 2025-06-06 | End: 2025-06-10

## 2025-06-06 RX ORDER — OXYCODONE HYDROCHLORIDE 30 MG/1
5 TABLET ORAL ONCE
Refills: 0 | Status: DISCONTINUED | OUTPATIENT
Start: 2025-06-06 | End: 2025-06-06

## 2025-06-06 RX ORDER — ACETAMINOPHEN 500 MG/5ML
1000 LIQUID (ML) ORAL EVERY 6 HOURS
Refills: 0 | Status: DISCONTINUED | OUTPATIENT
Start: 2025-06-06 | End: 2025-06-10

## 2025-06-06 RX ORDER — CEFTRIAXONE 500 MG/1
1000 INJECTION, POWDER, FOR SOLUTION INTRAMUSCULAR; INTRAVENOUS ONCE
Refills: 0 | Status: COMPLETED | OUTPATIENT
Start: 2025-06-06 | End: 2025-06-06

## 2025-06-06 RX ADMIN — SODIUM CHLORIDE 75 MILLILITER(S): 9 INJECTION, SOLUTION INTRAVENOUS at 14:44

## 2025-06-06 RX ADMIN — Medication 1000 MILLIGRAM(S): at 20:56

## 2025-06-06 RX ADMIN — Medication 40 MILLIGRAM(S): at 11:45

## 2025-06-06 RX ADMIN — OXYCODONE HYDROCHLORIDE 5 MILLIGRAM(S): 30 TABLET ORAL at 08:20

## 2025-06-06 RX ADMIN — FOLIC ACID 1 MILLIGRAM(S): 1 TABLET ORAL at 11:44

## 2025-06-06 RX ADMIN — Medication 650 MILLIGRAM(S): at 06:49

## 2025-06-06 RX ADMIN — CEFTRIAXONE 100 MILLIGRAM(S): 500 INJECTION, POWDER, FOR SOLUTION INTRAMUSCULAR; INTRAVENOUS at 11:45

## 2025-06-06 RX ADMIN — Medication 100 MILLILITER(S): at 01:24

## 2025-06-06 RX ADMIN — Medication 1000 MILLIGRAM(S): at 21:50

## 2025-06-06 RX ADMIN — Medication 100 MILLILITER(S): at 11:37

## 2025-06-06 RX ADMIN — SODIUM CHLORIDE 75 MILLILITER(S): 9 INJECTION, SOLUTION INTRAVENOUS at 19:39

## 2025-06-06 RX ADMIN — OXYCODONE HYDROCHLORIDE 5 MILLIGRAM(S): 30 TABLET ORAL at 09:04

## 2025-06-06 RX ADMIN — Medication 650 MILLIGRAM(S): at 06:28

## 2025-06-06 RX ADMIN — AMLODIPINE BESYLATE 5 MILLIGRAM(S): 10 TABLET ORAL at 11:47

## 2025-06-06 RX ADMIN — CEFTRIAXONE 100 MILLIGRAM(S): 500 INJECTION, POWDER, FOR SOLUTION INTRAMUSCULAR; INTRAVENOUS at 01:24

## 2025-06-06 NOTE — H&P ADULT - HISTORY OF PRESENT ILLNESS
50 yo female with no PMHx recently, yesterday diagnosed with 3 large uterine fibroids. Patient was seen by her GYN as an outpatient due to increasing pelvic pain, official US showed 3 large fibroids. Patient denies active bleeding LMP 05/25/25, resolved. Patient was told she needs surgery and has appointment on sunday for surgical evaluation, however the pain is increasing and she decided to come to ER. Patient has been taking IBU without relief. States 400 mg TID since April

## 2025-06-06 NOTE — H&P ADULT - NSHPPHYSICALEXAM_GEN_ALL_CORE
PHYSICAL EXAM: vital signs noted on Sunrise  in no apparent distress  HEENT: JOSSELINE EOMI  Neck: Supple, no JVD, no thyromegaly  Lungs: no wheeze, no crackles  CVS: S1 S2 systolic murmur + G  Abdomen: minimal tenderness epigastrium no organomegaly, BS present  Neuro: AO x 3 no focal weakness, no sensory abnormalities  Skin: warm, dry  Ext: no cyanosis or clubbing, no edema  Msk: no joint swelling or deformities  Back: no CVA tenderness, no kyphosis/scoliosis

## 2025-06-06 NOTE — H&P ADULT - NSHPREVIEWOFSYSTEMS_GEN_ALL_CORE
Gen: no loss of wt no loss of appetite  ENT: no dizziness no hearing loss  Ophth: no blurring of vision no loss of vision  Resp: No cough no sputum production  CVS: No chest pain no palpitations no orthopnea  GI: no nausea, vomiting or diarrhea + abdominal pain  : no dysuria, hematuria  Endo: no polyuria no excessive sweating  Neuro: no weakness no paresthesias  Heme: No petechiae no easy bruising  Msk: No joint pain no swelling  Skin: No rash no itching

## 2025-06-06 NOTE — H&P ADULT - PROBLEM SELECTOR PLAN 1
likely secondary to NSAID use as well as obstructive component seen on US  will have renal see (called by me)  repeat stat BMP pending  continue IVF  US renal consistent with hydro on right side with bilateral parenchymal stones  awaiting renal recommendations

## 2025-06-06 NOTE — H&P ADULT - NSHPADDITIONALINFOADULT_GEN_ALL_CORE
discussed with patient, expresses understanding of treatment plans.  discussed with daughter at bedside in detail   (both speak Lakshmi)

## 2025-06-06 NOTE — CONSULT NOTE ADULT - ASSESSMENT
48 yo female with no PMHx recently, yesterday diagnosed with 3 large uterine fibroids  ROMAN in light of kidney stones and mild hydro but also in light of continues NSAIDS    48 yo female with no PMHx recently, yesterday diagnosed with 3 large uterine fibroids  ROMAN in light of kidney stones and mild hydro but also in light of continued NSAIDS   Mult kidney stones may have been the cause of abd pain vs the fibroids    1 Renal- Would change IVF to 1/2 ns with 75 meq /nahco3 at 75cc hr  Trend creatinine   She is in ATN and this will take timet o resolve   2 CVS- Not in heart failure   3 ID- IV abx for UTI   4 GI-Protonix can be cont    Awaiting resolution of ROMAN before surgery     Sayed [facility]   [phone]    48 yo female with no PMHx recently, yesterday diagnosed with 3 large uterine fibroids  ROMAN in light of kidney stones and mild hydro but also in light of continued NSAIDS .  Suspect the later the cause and not the kidney stones or hydro.  If ROMAN does not improve by monday then renal scan   Mult kidney stones may have been the cause of abd pain vs the fibroids    1 Renal- Would change IVF to 1/2 ns with 75 meq /nahco3 at 75cc hr  Trend creatinine   She is in ATN and this will take timet o resolve   Kidney stone eval as outpt and when out of renal failure   2 CVS- Not in heart failure   3 ID- IV abx for UTI   4 GI-Protonix can be cont    Awaiting resolution of ROMAN before surgery     Sayed [facility]   [phone]

## 2025-06-06 NOTE — PATIENT PROFILE ADULT - FUNCTIONAL ASSESSMENT - DAILY ACTIVITY SECTION LABEL
Status[de-identified] INPATIENT [101]   Type of Bed: Medical [8]   Cardiac Monitoring Required?: No   Inpatient Hospitalization Certified Necessary for the Following Reasons: 3.  Patient receiving treatment that can only be provided in an inpatient setting (further clarification in H&P documentation)   Admitting Diagnosis: Hip dislocation, left [facility]) [[id number]]   Admitting Physician: Saurav Caputo [[id number]]   Attending Physician: Saurav Caputo [[id number]]   Estimated Length of Stay: 5-7 Midnights   Discharge Plan[de-identified] Home with Office Follow-up
.

## 2025-06-06 NOTE — CONSULT NOTE ADULT - SUBJECTIVE AND OBJECTIVE BOX
NEPHROLOGY - NSN    Patient seen and examined.    HPI:  50 yo female with no PMHx recently, yesterday diagnosed with 3 large uterine fibroids. Patient was seen by her GYN as an outpatient due to increasing pelvic pain, official US showed 3 large fibroids. Patient denies active bleeding LMP 05/25/25, resolved. Patient was told she needs surgery and has appointment on sunday for surgical evaluation, however the pain is increasing and she decided to come to ER. Patient has been taking IBU without relief. States 400 mg TID since April (06 Jun 2025 09:12)      PAST MEDICAL & SURGICAL HISTORY:  HTN (hypertension)      Anemia      Fibroids      Vitamin B12 deficiency      No significant past surgical history          MEDICATIONS  (STANDING):  amLODIPine   Tablet 5 milliGRAM(s) Oral daily  cefTRIAXone   IVPB 1000 milliGRAM(s) IV Intermittent every 24 hours  folic acid 1 milliGRAM(s) Oral daily  pantoprazole    Tablet 40 milliGRAM(s) Oral before breakfast  sodium chloride 0.9%. 1000 milliLiter(s) (100 mL/Hr) IV Continuous <Continuous>      Allergies    No Known Allergies    Intolerances        SOCIAL HISTORY:  Denies alcohol abuse, drug abuse or tobacco usage.     FAMILY HISTORY:  No pertinent family history in first degree relatives        VITALS:  T(C): 36.4 (06-06-25 @ 10:00), Max: 36.8 (06-06-25 @ 08:00)  HR: 86 (06-06-25 @ 10:00) (62 - 102)  BP: 119/80 (06-06-25 @ 10:00) (119/80 - 159/83)  RR: 18 (06-06-25 @ 10:00) (16 - 18)  SpO2: 95% (06-06-25 @ 10:00) (95% - 99%)    REVIEW OF SYSTEMS:  Denies any nausea, vomiting, diarrhea, fever or chills. Denies chest pain, SOB, focal weakness, hematuria or dysuria. Good oral intake and denies fatigue or weakness. All other pertinent systems are reviewed and are negative.    PHYSICAL EXAM:  Constitutional: NAD  HEENT: EOMI  Neck:  No JVD, supple   Respiratory: CTA B/L  Cardiovascular: S1 and S2, RRR  Gastrointestinal: + BS, soft, NT, ND  Extremities: No peripheral edema, + peripheral pulses  Neurological: A/O x 3, CN2-12 intact  Psychiatric: Normal mood, normal affect  : No Moyer  Skin: No rashes, C/D/I  Access: Not applicable    I and O's:      Weight (kg): 70.3 (06-05 @ 19:14)    LABS:                        7.8    14.49 )-----------( 593      ( 06 Jun 2025 09:03 )             25.9     06-06    137  |  107  |  73[H]  ----------------------------<  84  5.2   |  15[L]  |  5.73[H]    Ca    9.4      06 Jun 2025 09:03    TPro  8.6[H]  /  Alb  3.6  /  TBili  <0.2  /  DBili  x   /  AST  8   /  ALT  6   /  AlkPhos  79  06-05      URINE:  Urinalysis Basic - ( 06 Jun 2025 09:03 )    Color: x / Appearance: x / SG: x / pH: x  Gluc: 84 mg/dL / Ketone: x  / Bili: x / Urobili: x   Blood: x / Protein: x / Nitrite: x   Leuk Esterase: x / RBC: x / WBC x   Sq Epi: x / Non Sq Epi: x / Bacteria: x        RADIOLOGY & ADDITIONAL STUDIES:    < from: US Kidney and Bladder (06.06.25 @ 00:56) >    ACC: [id number] EXAM:  US KIDNEYS AND BLADDER   ORDERED BY: MALIK NATARAJAN     PROCEDURE DATE:  06/06/2025          INTERPRETATION:  CLINICAL INFORMATION: Acute kidney injury.    COMPARISON: None available.    TECHNIQUE: Sonography of the kidneys and bladder.    FINDINGS:  Right kidney: 10.4 cm. Mild to moderate right hydronephrosis. 1.4 x 1.5 x   0.6 cm lower pole stone. 1.2 x 1.2 x 1.1 cm interpolar stone. 1.5 x 1.2 x   0.6 cm upper pole stone.    Left kidney: 11.3 cm. No hydronephrosis. 1.2 x 0.6 cm lower pole stone.   1.2 x 1.5 x 0.9 cm interpolar stone. 1.2 x 1.4 x 0.7 cm upper pole stone.    Urinary bladder: Within normal limits.    IMPRESSION:  Mild to moderate right hydronephrosis. Bilateral renal stones as above.        --- End of Report ---        < end of copied text >   NEPHROLOGY - NSN    Patient seen and examined.    HPI:  50 yo female with no PMHx recently, yesterday diagnosed with 3 large uterine fibroids. Patient was seen by her GYN as an outpatient due to increasing pelvic pain, official US showed 3 large fibroids. Patient denies active bleeding LMP 05/25/25, resolved. Patient was told she needs surgery and has appointment on sunday for surgical evaluation, however the pain is increasing and she decided to come to ER. Patient has been taking IBU without relief. States 400 mg TID since April (06 Jun 2025 09:12)  She has been having abd pain for 1.5 months   There is no hematuria or bubbles in the urine.  No history of NSAIDS or nephrolithisis.  The patient urinates once or twice in the night and there is no incontinence.  No family hx or renal disease or back pain.    No recent abx use.  No alleviating or aggravating factors with respect to the kidneys.     PAST MEDICAL & SURGICAL HISTORY:  HTN (hypertension)      Anemia      Fibroids      Vitamin B12 deficiency      No significant past surgical history          MEDICATIONS  (STANDING):  amLODIPine   Tablet 5 milliGRAM(s) Oral daily  cefTRIAXone   IVPB 1000 milliGRAM(s) IV Intermittent every 24 hours  folic acid 1 milliGRAM(s) Oral daily  pantoprazole    Tablet 40 milliGRAM(s) Oral before breakfast  sodium chloride 0.9%. 1000 milliLiter(s) (100 mL/Hr) IV Continuous <Continuous>      Allergies    No Known Allergies    Intolerances        SOCIAL HISTORY:  Denies alcohol abuse, drug abuse or tobacco usage.     FAMILY HISTORY:  No pertinent family history in first degree relatives        VITALS:  T(C): 36.4 (06-06-25 @ 10:00), Max: 36.8 (06-06-25 @ 08:00)  HR: 86 (06-06-25 @ 10:00) (62 - 102)  BP: 119/80 (06-06-25 @ 10:00) (119/80 - 159/83)  RR: 18 (06-06-25 @ 10:00) (16 - 18)  SpO2: 95% (06-06-25 @ 10:00) (95% - 99%)    REVIEW OF SYSTEMS:  Denies any nausea, vomiting, diarrhea, fever or chills.  denies fatigue or weakness. All other pertinent systems are reviewed and are negative.    PHYSICAL EXAM:  Constitutional: NAD  HEENT: EOMI  Neck:  No JVD, supple   Respiratory: CTA B/L  Cardiovascular: S1 and S2, RRR  Gastrointestinal: + BS, soft, NT, ND  Extremities: No peripheral edema, + peripheral pulses  Neurological: A/O x 3, CN2-12 intact  Psychiatric: Normal mood, normal affect  : No Moyer  Skin: No rashes, C/D/I  Access: Not applicable    I and O's:      Weight (kg): 70.3 (06-05 @ 19:14)    LABS:                        7.8    14.49 )-----------( 593      ( 06 Jun 2025 09:03 )             25.9     06-06    137  |  107  |  73[H]  ----------------------------<  84  5.2   |  15[L]  |  5.73[H]    Ca    9.4      06 Jun 2025 09:03    TPro  8.6[H]  /  Alb  3.6  /  TBili  <0.2  /  DBili  x   /  AST  8   /  ALT  6   /  AlkPhos  79  06-05      URINE:  Urinalysis Basic - ( 06 Jun 2025 09:03 )    Color: x / Appearance: x / SG: x / pH: x  Gluc: 84 mg/dL / Ketone: x  / Bili: x / Urobili: x   Blood: x / Protein: x / Nitrite: x   Leuk Esterase: x / RBC: x / WBC x   Sq Epi: x / Non Sq Epi: x / Bacteria: x        RADIOLOGY & ADDITIONAL STUDIES:    < from: US Kidney and Bladder (06.06.25 @ 00:56) >    ACC: [id number] EXAM:  US KIDNEYS AND BLADDER   ORDERED BY: MALIK NATARAJAN     PROCEDURE DATE:  06/06/2025          INTERPRETATION:  CLINICAL INFORMATION: Acute kidney injury.    COMPARISON: None available.    TECHNIQUE: Sonography of the kidneys and bladder.    FINDINGS:  Right kidney: 10.4 cm. Mild to moderate right hydronephrosis. 1.4 x 1.5 x   0.6 cm lower pole stone. 1.2 x 1.2 x 1.1 cm interpolar stone. 1.5 x 1.2 x   0.6 cm upper pole stone.    Left kidney: 11.3 cm. No hydronephrosis. 1.2 x 0.6 cm lower pole stone.   1.2 x 1.5 x 0.9 cm interpolar stone. 1.2 x 1.4 x 0.7 cm upper pole stone.    Urinary bladder: Within normal limits.    IMPRESSION:  Mild to moderate right hydronephrosis. Bilateral renal stones as above.        --- End of Report ---        < end of copied text >

## 2025-06-06 NOTE — H&P ADULT - ASSESSMENT
48 yo female with no PMHx recently, yesterday diagnosed with 3 large uterine fibroids. Patient was seen by her GYN as an outpatient due to increasing pelvic pain discovered to have ROMAN severe anemia as well as likely UTI with likely associated sepsis

## 2025-06-06 NOTE — H&P ADULT - PROBLEM SELECTOR PLAN 3
ferritin was low ~ 1 year ago per PCP records  will continue to monitor  likely multifactorial acute on chronic blood loss anemia as well as ROMAN  transfusion if  hemoglobin < 7  IV Fe

## 2025-06-07 LAB
A1C WITH ESTIMATED AVERAGE GLUCOSE RESULT: 5.8 % — HIGH (ref 4–5.6)
ADD ON TEST-SPECIMEN IN LAB: SIGNIFICANT CHANGE UP
ANION GAP SERPL CALC-SCNC: 17 MMOL/L — HIGH (ref 7–14)
BUN SERPL-MCNC: 66 MG/DL — HIGH (ref 7–23)
CALCIUM SERPL-MCNC: 9.4 MG/DL — SIGNIFICANT CHANGE UP (ref 8.4–10.5)
CHLORIDE SERPL-SCNC: 106 MMOL/L — SIGNIFICANT CHANGE UP (ref 98–107)
CO2 SERPL-SCNC: 16 MMOL/L — LOW (ref 22–31)
CREAT SERPL-MCNC: 5.27 MG/DL — HIGH (ref 0.5–1.3)
EGFR: 9 ML/MIN/1.73M2 — LOW
EGFR: 9 ML/MIN/1.73M2 — LOW
ESTIMATED AVERAGE GLUCOSE: 120 — SIGNIFICANT CHANGE UP
GLUCOSE SERPL-MCNC: 79 MG/DL — SIGNIFICANT CHANGE UP (ref 70–99)
HCT VFR BLD CALC: 27.2 % — LOW (ref 34.5–45)
HGB BLD-MCNC: 8.4 G/DL — LOW (ref 11.5–15.5)
MCHC RBC-ENTMCNC: 22.8 PG — LOW (ref 27–34)
MCHC RBC-ENTMCNC: 30.9 G/DL — LOW (ref 32–36)
MCV RBC AUTO: 73.7 FL — LOW (ref 80–100)
NRBC # BLD AUTO: 0 K/UL — SIGNIFICANT CHANGE UP (ref 0–0)
NRBC # FLD: 0 K/UL — SIGNIFICANT CHANGE UP (ref 0–0)
NRBC BLD AUTO-RTO: 0 /100 WBCS — SIGNIFICANT CHANGE UP (ref 0–0)
PLATELET # BLD AUTO: 629 K/UL — HIGH (ref 150–400)
POTASSIUM SERPL-MCNC: 4.5 MMOL/L — SIGNIFICANT CHANGE UP (ref 3.5–5.3)
POTASSIUM SERPL-SCNC: 4.5 MMOL/L — SIGNIFICANT CHANGE UP (ref 3.5–5.3)
RBC # BLD: 3.69 M/UL — LOW (ref 3.8–5.2)
RBC # FLD: 19.9 % — HIGH (ref 10.3–14.5)
SODIUM SERPL-SCNC: 139 MMOL/L — SIGNIFICANT CHANGE UP (ref 135–145)
T4 FREE SERPL-MCNC: 1 NG/DL — SIGNIFICANT CHANGE UP (ref 0.9–1.8)
TSH SERPL-MCNC: 6.18 UIU/ML — HIGH (ref 0.27–4.2)
WBC # BLD: 13.18 K/UL — HIGH (ref 3.8–10.5)
WBC # FLD AUTO: 13.18 K/UL — HIGH (ref 3.8–10.5)

## 2025-06-07 RX ORDER — POLYETHYLENE GLYCOL 3350 17 G/17G
17 POWDER, FOR SOLUTION ORAL DAILY
Refills: 0 | Status: DISCONTINUED | OUTPATIENT
Start: 2025-06-07 | End: 2025-06-10

## 2025-06-07 RX ORDER — CYANOCOBALAMIN 1000 UG/ML
1000 INJECTION INTRAMUSCULAR; SUBCUTANEOUS ONCE
Refills: 0 | Status: COMPLETED | OUTPATIENT
Start: 2025-06-07 | End: 2025-06-07

## 2025-06-07 RX ORDER — SODIUM CHLORIDE 9 G/1000ML
1000 INJECTION, SOLUTION INTRAVENOUS
Refills: 0 | Status: DISCONTINUED | OUTPATIENT
Start: 2025-06-07 | End: 2025-06-08

## 2025-06-07 RX ORDER — IRON SUCROSE 20 MG/ML
200 INJECTION, SOLUTION INTRAVENOUS EVERY 24 HOURS
Refills: 0 | Status: COMPLETED | OUTPATIENT
Start: 2025-06-07 | End: 2025-06-08

## 2025-06-07 RX ADMIN — CEFTRIAXONE 100 MILLIGRAM(S): 500 INJECTION, POWDER, FOR SOLUTION INTRAMUSCULAR; INTRAVENOUS at 11:26

## 2025-06-07 RX ADMIN — SODIUM CHLORIDE 75 MILLILITER(S): 9 INJECTION, SOLUTION INTRAVENOUS at 13:01

## 2025-06-07 RX ADMIN — SODIUM CHLORIDE 75 MILLILITER(S): 9 INJECTION, SOLUTION INTRAVENOUS at 04:48

## 2025-06-07 RX ADMIN — IRON SUCROSE 110 MILLIGRAM(S): 20 INJECTION, SOLUTION INTRAVENOUS at 13:00

## 2025-06-07 RX ADMIN — Medication 40 MILLIGRAM(S): at 10:49

## 2025-06-07 RX ADMIN — POLYETHYLENE GLYCOL 3350 17 GRAM(S): 17 POWDER, FOR SOLUTION ORAL at 18:14

## 2025-06-07 RX ADMIN — Medication 40 MILLIGRAM(S): at 06:36

## 2025-06-07 RX ADMIN — AMLODIPINE BESYLATE 5 MILLIGRAM(S): 10 TABLET ORAL at 06:38

## 2025-06-07 RX ADMIN — SODIUM CHLORIDE 75 MILLILITER(S): 9 INJECTION, SOLUTION INTRAVENOUS at 19:55

## 2025-06-07 RX ADMIN — CYANOCOBALAMIN 1000 MICROGRAM(S): 1000 INJECTION INTRAMUSCULAR; SUBCUTANEOUS at 18:14

## 2025-06-07 RX ADMIN — Medication 40 MILLIGRAM(S): at 17:31

## 2025-06-07 RX ADMIN — FOLIC ACID 1 MILLIGRAM(S): 1 TABLET ORAL at 11:29

## 2025-06-07 NOTE — PROGRESS NOTE ADULT - PROBLEM SELECTOR PLAN 1
likely secondary to NSAID use as well as obstructive component seen on US  renal help appreciated  continue to follow renal recommendations  creatinine improved ~ 5.2 likely secondary to NSAID use as well as obstructive component seen on US  renal help appreciated  continue to follow renal recommendations  creatinine improved ~ 5.2  epi tenderness likely from gastritis secondary to NSAIDs  IV pantoprazole x 2 days

## 2025-06-07 NOTE — PROGRESS NOTE ADULT - SUBJECTIVE AND OBJECTIVE BOX
NEPHROLOGY-NSN [phone]        Patient seen and examined in bed.  She was the same         MEDICATIONS  (STANDING):  amLODIPine   Tablet 5 milliGRAM(s) Oral daily  cefTRIAXone   IVPB 1000 milliGRAM(s) IV Intermittent every 24 hours  folic acid 1 milliGRAM(s) Oral daily  pantoprazole  Injectable 40 milliGRAM(s) IV Push two times a day  sodium chloride 0.45% 1000 milliLiter(s) (75 mL/Hr) IV Continuous <Continuous>      VITAL:  T(C): , Max: 36.9 (06-06-25 @ 22:00)  T(F): , Max: 98.5 (06-06-25 @ 22:00)  HR: 86 (06-07-25 @ 08:00)  BP: 127/89 (06-07-25 @ 08:00)  BP(mean): --  RR: 18 (06-07-25 @ 08:00)  SpO2: 96% (06-07-25 @ 08:00)  Wt(kg): --    I and O's:        PHYSICAL EXAM:    Constitutional: NAD  Neck:  No JVD  Respiratory: CTAB/L  Cardiovascular: S1 and S2  Gastrointestinal: BS+, soft, NT/ND  Extremities: No peripheral edema  Neurological: A/O x 3, no focal deficits  Psychiatric: Normal mood, normal affect  : No Moyer  Skin: No rashes  Access: Not applicable    LABS:                        8.4    13.18 )-----------( 629      ( 07 Jun 2025 05:00 )             27.2     06-07    139  |  106  |  66[H]  ----------------------------<  79  4.5   |  16[L]  |  5.27[H]    Ca    9.4      07 Jun 2025 05:00    TPro  8.6[H]  /  Alb  3.6  /  TBili  <0.2  /  DBili  x   /  AST  8   /  ALT  6   /  AlkPhos  79  06-05          Urine Studies:  Urinalysis Basic - ( 07 Jun 2025 05:00 )    Color: x / Appearance: x / SG: x / pH: x  Gluc: 79 mg/dL / Ketone: x  / Bili: x / Urobili: x   Blood: x / Protein: x / Nitrite: x   Leuk Esterase: x / RBC: x / WBC x   Sq Epi: x / Non Sq Epi: x / Bacteria: x            RADIOLOGY & ADDITIONAL STUDIES:

## 2025-06-07 NOTE — PROGRESS NOTE ADULT - SUBJECTIVE AND OBJECTIVE BOX
Patient is a 49y old  Female who presents with a chief complaint of abnormal labs and abdominal pain (06 Jun 2025 10:54)      DATE OF SERVICE: 06-07-25 @ 10:17    SUBJECTIVE / OVERNIGHT EVENTS: overnight events noted    ROS:  Resp: No cough no sputum production  CVS: No chest pain no palpitations no orthopnea  GI: no N/V/D  son at bedside       MEDICATIONS  (STANDING):  amLODIPine   Tablet 5 milliGRAM(s) Oral daily  cefTRIAXone   IVPB 1000 milliGRAM(s) IV Intermittent every 24 hours  folic acid 1 milliGRAM(s) Oral daily  pantoprazole  Injectable 40 milliGRAM(s) IV Push two times a day  sodium chloride 0.45% 1000 milliLiter(s) (75 mL/Hr) IV Continuous <Continuous>    MEDICATIONS  (PRN):  acetaminophen     Tablet .. 1000 milliGRAM(s) Oral every 6 hours PRN Temp greater or equal to 38C (100.4F), Mild Pain (1 - 3)        CAPILLARY BLOOD GLUCOSE        I&O's Summary      Vital Signs Last 24 Hrs  T(C): 36.4 (07 Jun 2025 08:00), Max: 36.9 (06 Jun 2025 22:00)  T(F): 97.6 (07 Jun 2025 08:00), Max: 98.5 (06 Jun 2025 22:00)  HR: 86 (07 Jun 2025 08:00) (78 - 98)  BP: 127/89 (07 Jun 2025 08:00) (113/74 - 142/90)  BP(mean): --  RR: 18 (07 Jun 2025 08:00) (17 - 18)  SpO2: 96% (07 Jun 2025 08:00) (96% - 100%)    PHYSICAL EXAM:   Lungs: clear  CVS: S1 S2 systolic murmur   Abdomen: + + tenderness epigastrium   NO CVA tenderness   Neuro: AO x 3 nonfocal   Ext: no edema    LABS:                        8.4    13.18 )-----------( 629      ( 07 Jun 2025 05:00 )             27.2     06-07    139  |  106  |  66[H]  ----------------------------<  79  4.5   |  16[L]  |  5.27[H]    Ca    9.4      07 Jun 2025 05:00    TPro  8.6[H]  /  Alb  3.6  /  TBili  <0.2  /  DBili  x   /  AST  8   /  ALT  6   /  AlkPhos  79  06-05          Urinalysis Basic - ( 07 Jun 2025 05:00 )    Color: x / Appearance: x / SG: x / pH: x  Gluc: 79 mg/dL / Ketone: x  / Bili: x / Urobili: x   Blood: x / Protein: x / Nitrite: x   Leuk Esterase: x / RBC: x / WBC x   Sq Epi: x / Non Sq Epi: x / Bacteria: x          All consultant(s) notes reviewed and care discussed with other providers        Contact Number, Dr Campbell [phone]

## 2025-06-07 NOTE — PROGRESS NOTE ADULT - PROBLEM SELECTOR PLAN 3
ferritin was low ~ 1 year ago per PCP records  will continue to monitor  B12 normal   will however replete  venofer x 2 doses ferritin was low ~ 1 year ago per PCP records  will continue to monitor  B12 normal   will however replete  Venofer x 2 doses

## 2025-06-08 LAB
ANION GAP SERPL CALC-SCNC: 15 MMOL/L — HIGH (ref 7–14)
BLD GP AB SCN SERPL QL: NEGATIVE — SIGNIFICANT CHANGE UP
BUN SERPL-MCNC: 58 MG/DL — HIGH (ref 7–23)
CALCIUM SERPL-MCNC: 9.2 MG/DL — SIGNIFICANT CHANGE UP (ref 8.4–10.5)
CHLORIDE SERPL-SCNC: 104 MMOL/L — SIGNIFICANT CHANGE UP (ref 98–107)
CO2 SERPL-SCNC: 19 MMOL/L — LOW (ref 22–31)
CREAT SERPL-MCNC: 4.69 MG/DL — HIGH (ref 0.5–1.3)
EGFR: 11 ML/MIN/1.73M2 — LOW
EGFR: 11 ML/MIN/1.73M2 — LOW
GLUCOSE SERPL-MCNC: 90 MG/DL — SIGNIFICANT CHANGE UP (ref 70–99)
HCT VFR BLD CALC: 19.6 % — CRITICAL LOW (ref 34.5–45)
HCT VFR BLD CALC: 22.8 % — LOW (ref 34.5–45)
HGB BLD-MCNC: 6.3 G/DL — CRITICAL LOW (ref 11.5–15.5)
HGB BLD-MCNC: 7.2 G/DL — LOW (ref 11.5–15.5)
MAGNESIUM SERPL-MCNC: 1.8 MG/DL — SIGNIFICANT CHANGE UP (ref 1.6–2.6)
MCHC RBC-ENTMCNC: 23.1 PG — LOW (ref 27–34)
MCHC RBC-ENTMCNC: 23.1 PG — LOW (ref 27–34)
MCHC RBC-ENTMCNC: 31.6 G/DL — LOW (ref 32–36)
MCHC RBC-ENTMCNC: 32.1 G/DL — SIGNIFICANT CHANGE UP (ref 32–36)
MCV RBC AUTO: 71.8 FL — LOW (ref 80–100)
MCV RBC AUTO: 73.1 FL — LOW (ref 80–100)
NRBC # BLD AUTO: 0 K/UL — SIGNIFICANT CHANGE UP (ref 0–0)
NRBC # BLD AUTO: 0 K/UL — SIGNIFICANT CHANGE UP (ref 0–0)
NRBC # FLD: 0 K/UL — SIGNIFICANT CHANGE UP (ref 0–0)
NRBC # FLD: 0 K/UL — SIGNIFICANT CHANGE UP (ref 0–0)
NRBC BLD AUTO-RTO: 0 /100 WBCS — SIGNIFICANT CHANGE UP (ref 0–0)
NRBC BLD AUTO-RTO: 0 /100 WBCS — SIGNIFICANT CHANGE UP (ref 0–0)
PHOSPHATE SERPL-MCNC: 4.9 MG/DL — HIGH (ref 2.5–4.5)
PLATELET # BLD AUTO: 653 K/UL — HIGH (ref 150–400)
PLATELET # BLD AUTO: 665 K/UL — HIGH (ref 150–400)
POTASSIUM SERPL-MCNC: 3.9 MMOL/L — SIGNIFICANT CHANGE UP (ref 3.5–5.3)
POTASSIUM SERPL-SCNC: 3.9 MMOL/L — SIGNIFICANT CHANGE UP (ref 3.5–5.3)
RBC # BLD: 2.73 M/UL — LOW (ref 3.8–5.2)
RBC # BLD: 3.12 M/UL — LOW (ref 3.8–5.2)
RBC # FLD: 19.5 % — HIGH (ref 10.3–14.5)
RBC # FLD: 19.7 % — HIGH (ref 10.3–14.5)
RH IG SCN BLD-IMP: POSITIVE — SIGNIFICANT CHANGE UP
SODIUM SERPL-SCNC: 138 MMOL/L — SIGNIFICANT CHANGE UP (ref 135–145)
WBC # BLD: 11.6 K/UL — HIGH (ref 3.8–10.5)
WBC # BLD: 12.19 K/UL — HIGH (ref 3.8–10.5)
WBC # FLD AUTO: 11.6 K/UL — HIGH (ref 3.8–10.5)
WBC # FLD AUTO: 12.19 K/UL — HIGH (ref 3.8–10.5)

## 2025-06-08 RX ORDER — SODIUM CHLORIDE 9 G/1000ML
1000 INJECTION, SOLUTION INTRAVENOUS
Refills: 0 | Status: DISCONTINUED | OUTPATIENT
Start: 2025-06-08 | End: 2025-06-09

## 2025-06-08 RX ADMIN — POLYETHYLENE GLYCOL 3350 17 GRAM(S): 17 POWDER, FOR SOLUTION ORAL at 11:49

## 2025-06-08 RX ADMIN — FOLIC ACID 1 MILLIGRAM(S): 1 TABLET ORAL at 11:49

## 2025-06-08 RX ADMIN — SODIUM CHLORIDE 75 MILLILITER(S): 9 INJECTION, SOLUTION INTRAVENOUS at 11:49

## 2025-06-08 RX ADMIN — Medication 40 MILLIGRAM(S): at 06:15

## 2025-06-08 RX ADMIN — IRON SUCROSE 110 MILLIGRAM(S): 20 INJECTION, SOLUTION INTRAVENOUS at 16:59

## 2025-06-08 RX ADMIN — SODIUM CHLORIDE 75 MILLILITER(S): 9 INJECTION, SOLUTION INTRAVENOUS at 06:01

## 2025-06-08 RX ADMIN — Medication 40 MILLIGRAM(S): at 17:57

## 2025-06-08 RX ADMIN — CEFTRIAXONE 100 MILLIGRAM(S): 500 INJECTION, POWDER, FOR SOLUTION INTRAMUSCULAR; INTRAVENOUS at 13:11

## 2025-06-08 RX ADMIN — AMLODIPINE BESYLATE 5 MILLIGRAM(S): 10 TABLET ORAL at 05:54

## 2025-06-08 NOTE — PROGRESS NOTE ADULT - PROBLEM SELECTOR PLAN 3
ferritin was low ~ 1 year ago per PCP records  will continue to monitor  6.8 this AM now 7.3  avoid transfusion unless consistently < 7  B12 normal   will however replete x 1  complete Venofer x 2 doses

## 2025-06-08 NOTE — PROGRESS NOTE ADULT - SUBJECTIVE AND OBJECTIVE BOX
Patient seen and examined at bedside. Resting in bed. Family at bedside      VITAL:  T(C): , Max: 36.9 (06-07-25 @ 20:00)  T(F): , Max: 98.5 (06-07-25 @ 20:00)  HR: 89 (06-08-25 @ 08:00)  BP: 126/84 (06-08-25 @ 08:00)  BP(mean): --  RR: 17 (06-08-25 @ 08:00)  SpO2: 99% (06-08-25 @ 08:00)  Wt(kg): --      PHYSICAL EXAM:  Constitutional: NAD  Neck:  No JVD  Respiratory: CTAB/L  Cardiovascular: S1 and S2  Gastrointestinal: BS+, soft, NT/ND  Extremities: No peripheral edema  Neurological: A/O x 3, no focal deficits  Psychiatric: Normal mood, normal affect  : No Moyer  Skin: No rashes  Access: Not applicable      LABS:                        7.2    12.19 )-----------( 653      ( 08 Jun 2025 06:30 )             22.8     Na(138)/K(3.9)/Cl(104)/HCO3(19)/BUN(58)/Cr(4.69)Glu(90)/Ca(9.2)/Mg(1.80)/PO4(4.9)    06-08 @ 03:51  Na(139)/K(4.5)/Cl(106)/HCO3(16)/BUN(66)/Cr(5.27)Glu(79)/Ca(9.4)/Mg(--)/PO4(--)    06-07 @ 05:00  Na(137)/K(5.2)/Cl(107)/HCO3(15)/BUN(73)/Cr(5.73)Glu(84)/Ca(9.4)/Mg(--)/PO4(--)    06-06 @ 09:03  Na(136)/K(4.7)/Cl(105)/HCO3(15)/BUN(76)/Cr(6.21)Glu(111)/Ca(9.5)/Mg(--)/PO4(--)    06-05 @ 21:00    Urinalysis Basic - ( 08 Jun 2025 03:51 )    Color: x / Appearance: x / SG: x / pH: x  Gluc: 90 mg/dL / Ketone: x  / Bili: x / Urobili: x   Blood: x / Protein: x / Nitrite: x   Leuk Esterase: x / RBC: x / WBC x   Sq Epi: x / Non Sq Epi: x / Bacteria: x

## 2025-06-08 NOTE — PROVIDER CONTACT NOTE (CRITICAL VALUE NOTIFICATION) - BACKGROUND
49F PMHx recently diagnosed 3 large uterine fibroids  seen by her GYN as an outpatient due to increasing pelvic pain discovered to have ROMAN severe anemia 2/2 NSAID as well as likely UTI on IV CTX

## 2025-06-08 NOTE — PROGRESS NOTE ADULT - PROBLEM SELECTOR PLAN 1
resolving   creatinine improved 4.6 today  epi tenderness likely from gastritis secondary to NSAIDs  IV pantoprazole x 2 days  already improved

## 2025-06-08 NOTE — PROGRESS NOTE ADULT - SUBJECTIVE AND OBJECTIVE BOX
Patient is a 49y old  Female who presents with a chief complaint of abnormal labs and abdominal pain (07 Jun 2025 10:20)      DATE OF SERVICE: 06-08-25 @ 09:41    SUBJECTIVE / OVERNIGHT EVENTS: overnight events noted    ROS:  Resp: No cough no sputum production  CVS: No chest pain no palpitations no orthopnea  GI: no N/V/D    MEDICATIONS  (STANDING):  amLODIPine   Tablet 5 milliGRAM(s) Oral daily  cefTRIAXone   IVPB 1000 milliGRAM(s) IV Intermittent every 24 hours  folic acid 1 milliGRAM(s) Oral daily  iron sucrose IVPB 200 milliGRAM(s) IV Intermittent every 24 hours  pantoprazole  Injectable 40 milliGRAM(s) IV Push two times a day  polyethylene glycol 3350 17 Gram(s) Oral daily  sodium chloride 0.45% 1000 milliLiter(s) (75 mL/Hr) IV Continuous <Continuous>    MEDICATIONS  (PRN):  acetaminophen     Tablet .. 1000 milliGRAM(s) Oral every 6 hours PRN Temp greater or equal to 38C (100.4F), Mild Pain (1 - 3)        CAPILLARY BLOOD GLUCOSE        I&O's Summary      Vital Signs Last 24 Hrs  T(C): 36.3 (08 Jun 2025 08:00), Max: 36.9 (07 Jun 2025 20:00)  T(F): 97.4 (08 Jun 2025 08:00), Max: 98.5 (07 Jun 2025 20:00)  HR: 89 (08 Jun 2025 08:00) (83 - 100)  BP: 126/84 (08 Jun 2025 08:00) (123/86 - 137/95)  BP(mean): --  RR: 17 (08 Jun 2025 08:00) (16 - 18)  SpO2: 99% (08 Jun 2025 08:00) (97% - 100%)    PHYSICAL EXAM:   Lungs: clear  CVS: S1 S2 systolic murmur   Abdomen: less tenderness epigastrium   NO CVA tenderness   Neuro: AO x 3 nonfocal   Ext: no edema    LABS:                        7.2    12.19 )-----------( 653      ( 08 Jun 2025 06:30 )             22.8     06-08    138  |  104  |  58[H]  ----------------------------<  90  3.9   |  19[L]  |  4.69[H]    Ca    9.2      08 Jun 2025 03:51  Phos  4.9     06-08  Mg     1.80     06-08            Urinalysis Basic - ( 08 Jun 2025 03:51 )    Color: x / Appearance: x / SG: x / pH: x  Gluc: 90 mg/dL / Ketone: x  / Bili: x / Urobili: x   Blood: x / Protein: x / Nitrite: x   Leuk Esterase: x / RBC: x / WBC x   Sq Epi: x / Non Sq Epi: x / Bacteria: x          All consultant(s) notes reviewed and care discussed with other providers        Contact Number, Dr Campbell [phone]

## 2025-06-09 LAB
ANION GAP SERPL CALC-SCNC: 16 MMOL/L — HIGH (ref 7–14)
BUN SERPL-MCNC: 48 MG/DL — HIGH (ref 7–23)
CALCIUM SERPL-MCNC: 9.5 MG/DL — SIGNIFICANT CHANGE UP (ref 8.4–10.5)
CHLORIDE SERPL-SCNC: 100 MMOL/L — SIGNIFICANT CHANGE UP (ref 98–107)
CO2 SERPL-SCNC: 24 MMOL/L — SIGNIFICANT CHANGE UP (ref 22–31)
CREAT SERPL-MCNC: 4.18 MG/DL — HIGH (ref 0.5–1.3)
EGFR: 12 ML/MIN/1.73M2 — LOW
EGFR: 12 ML/MIN/1.73M2 — LOW
GLUCOSE SERPL-MCNC: 92 MG/DL — SIGNIFICANT CHANGE UP (ref 70–99)
HCT VFR BLD CALC: 24.8 % — LOW (ref 34.5–45)
HGB BLD-MCNC: 7.8 G/DL — LOW (ref 11.5–15.5)
MAGNESIUM SERPL-MCNC: 1.7 MG/DL — SIGNIFICANT CHANGE UP (ref 1.6–2.6)
MCHC RBC-ENTMCNC: 22.6 PG — LOW (ref 27–34)
MCHC RBC-ENTMCNC: 31.5 G/DL — LOW (ref 32–36)
MCV RBC AUTO: 71.9 FL — LOW (ref 80–100)
NRBC # BLD AUTO: 0 K/UL — SIGNIFICANT CHANGE UP (ref 0–0)
NRBC # FLD: 0 K/UL — SIGNIFICANT CHANGE UP (ref 0–0)
NRBC BLD AUTO-RTO: 0 /100 WBCS — SIGNIFICANT CHANGE UP (ref 0–0)
PHOSPHATE SERPL-MCNC: 4.5 MG/DL — SIGNIFICANT CHANGE UP (ref 2.5–4.5)
PLATELET # BLD AUTO: 648 K/UL — HIGH (ref 150–400)
POTASSIUM SERPL-MCNC: 3.8 MMOL/L — SIGNIFICANT CHANGE UP (ref 3.5–5.3)
POTASSIUM SERPL-SCNC: 3.8 MMOL/L — SIGNIFICANT CHANGE UP (ref 3.5–5.3)
RBC # BLD: 3.45 M/UL — LOW (ref 3.8–5.2)
RBC # FLD: 19.4 % — HIGH (ref 10.3–14.5)
SODIUM SERPL-SCNC: 140 MMOL/L — SIGNIFICANT CHANGE UP (ref 135–145)
WBC # BLD: 11.8 K/UL — HIGH (ref 3.8–10.5)
WBC # FLD AUTO: 11.8 K/UL — HIGH (ref 3.8–10.5)

## 2025-06-09 RX ORDER — VANCOMYCIN HCL IN 5 % DEXTROSE 1.5G/250ML
1000 PLASTIC BAG, INJECTION (ML) INTRAVENOUS ONCE
Refills: 0 | Status: COMPLETED | OUTPATIENT
Start: 2025-06-09 | End: 2025-06-09

## 2025-06-09 RX ORDER — DOXYCYCLINE HYCLATE 100 MG
100 TABLET ORAL EVERY 12 HOURS
Refills: 0 | Status: DISCONTINUED | OUTPATIENT
Start: 2025-06-09 | End: 2025-06-10

## 2025-06-09 RX ADMIN — Medication 100 MILLIGRAM(S): at 22:11

## 2025-06-09 RX ADMIN — Medication 1000 MILLIGRAM(S): at 16:27

## 2025-06-09 RX ADMIN — Medication 40 MILLIGRAM(S): at 06:09

## 2025-06-09 RX ADMIN — CEFTRIAXONE 100 MILLIGRAM(S): 500 INJECTION, POWDER, FOR SOLUTION INTRAMUSCULAR; INTRAVENOUS at 12:20

## 2025-06-09 RX ADMIN — Medication 40 MILLIGRAM(S): at 17:31

## 2025-06-09 RX ADMIN — SODIUM CHLORIDE 60 MILLILITER(S): 9 INJECTION, SOLUTION INTRAVENOUS at 06:09

## 2025-06-09 RX ADMIN — Medication 250 MILLIGRAM(S): at 17:07

## 2025-06-09 RX ADMIN — Medication 1000 MILLIGRAM(S): at 15:57

## 2025-06-09 RX ADMIN — AMLODIPINE BESYLATE 5 MILLIGRAM(S): 10 TABLET ORAL at 06:09

## 2025-06-09 RX ADMIN — FOLIC ACID 1 MILLIGRAM(S): 1 TABLET ORAL at 11:01

## 2025-06-09 NOTE — PROGRESS NOTE ADULT - SUBJECTIVE AND OBJECTIVE BOX
Patient seen and examined in bed.  No new events.    REVIEW OF SYSTEMS:  As per HPI, otherwise 8 full 10 ROS were unremarkable    MEDICATIONS  (STANDING):  amLODIPine   Tablet 5 milliGRAM(s) Oral daily  cefTRIAXone   IVPB 1000 milliGRAM(s) IV Intermittent every 24 hours  folic acid 1 milliGRAM(s) Oral daily  pantoprazole  Injectable 40 milliGRAM(s) IV Push two times a day  polyethylene glycol 3350 17 Gram(s) Oral daily  sodium chloride 0.45% 1000 milliLiter(s) (60 mL/Hr) IV Continuous <Continuous>      VITAL:  T(C): , Max: 36.8 (06-08-25 @ 16:00)  T(F): , Max: 98.3 (06-09-25 @ 06:08)  HR: 84 (06-09-25 @ 06:08)  BP: 124/80 (06-09-25 @ 06:08)  BP(mean): --  RR: 18 (06-09-25 @ 06:08)  SpO2: 95% (06-09-25 @ 06:08)  Wt(kg): --    I and O's:        PHYSICAL EXAM:    Constitutional: NAD  HEENT: PERRLA, EOMI,  MMM  Neck: No LAD, No JVD  Respiratory: CTAB  Cardiovascular: S1 and S2  Gastrointestinal: BS+, soft, NT/ND  Extremities: No peripheral edema  Neurological: A/O x 3, no focal deficits  Psychiatric: Normal mood, normal affect  : No Moyer  Skin: No rashes  Access: Not applicable    LABS:                        7.8    11.80 )-----------( 648      ( 09 Jun 2025 04:30 )             24.8     06-09    140  |  100  |  48[H]  ----------------------------<  92  3.8   |  24  |  4.18[H]    Ca    9.5      09 Jun 2025 04:30  Phos  4.5     06-09  Mg     1.70     06-09        Urine Studies:  Urinalysis Basic - ( 09 Jun 2025 04:30 )    Color: x / Appearance: x / SG: x / pH: x  Gluc: 92 mg/dL / Ketone: x  / Bili: x / Urobili: x   Blood: x / Protein: x / Nitrite: x   Leuk Esterase: x / RBC: x / WBC x   Sq Epi: x / Non Sq Epi: x / Bacteria: x      Assessment and Plan:   · Assessment	  48 yo female with no PMHx recently, yesterday diagnosed with 3 large uterine fibroids  ROMAN in light of kidney stones and mild hydro but also in light of continued NSAIDS .  Suspect the later the cause and not the kidney stones or hydro.  Mult kidney stones may have been the cause of abd pain vs the fibroids    Plan:    1 Renal- We can C/w IVF 1/2 ns with 75 meq /nahco3 at 60cc/ hr for now; azotemia improving.  Lytes acceptable  Trend creatinine, down trending, daily bmp  She is in ATN and this will take time to resolve   Kidney stone eval as outpt and when out of renal failure   We can defer renal ultrasound at this time given azotemia improving   2 CVS- Not in heart failure, C/w norvasc 5 mg po daily- BP acceptable   3 ID- IV abx for UTI   4 GI-Protonix can be cont  5 Anemia- IV Iron x 2 doses . Hgb better today.  Continue to trend; transfuse per primary team     Awaiting resolution of ROMAN before surgery       Anahi Slater NP  [facility] [place]   [phone]      Patient seen and examined in bed.  No new events.    REVIEW OF SYSTEMS:  As per HPI, otherwise 8 full 10 ROS were unremarkable    MEDICATIONS  (STANDING):  amLODIPine   Tablet 5 milliGRAM(s) Oral daily  cefTRIAXone   IVPB 1000 milliGRAM(s) IV Intermittent every 24 hours  folic acid 1 milliGRAM(s) Oral daily  pantoprazole  Injectable 40 milliGRAM(s) IV Push two times a day  polyethylene glycol 3350 17 Gram(s) Oral daily  sodium chloride 0.45% 1000 milliLiter(s) (60 mL/Hr) IV Continuous <Continuous>      VITAL:  T(C): , Max: 36.8 (06-08-25 @ 16:00)  T(F): , Max: 98.3 (06-09-25 @ 06:08)  HR: 84 (06-09-25 @ 06:08)  BP: 124/80 (06-09-25 @ 06:08)  BP(mean): --  RR: 18 (06-09-25 @ 06:08)  SpO2: 95% (06-09-25 @ 06:08)  Wt(kg): --    I and O's:        PHYSICAL EXAM:    Constitutional: NAD  HEENT: PERRLA, EOMI,  MMM  Neck: No LAD, No JVD  Respiratory: CTAB  Cardiovascular: S1 and S2  Gastrointestinal: BS+, soft, NT/ND  Extremities: No peripheral edema  Neurological: A/O x 3, no focal deficits  Psychiatric: Normal mood, normal affect  : No Moyer  Skin: No rashes  Access: Not applicable    LABS:                        7.8    11.80 )-----------( 648      ( 09 Jun 2025 04:30 )             24.8     06-09    140  |  100  |  48[H]  ----------------------------<  92  3.8   |  24  |  4.18[H]    Ca    9.5      09 Jun 2025 04:30  Phos  4.5     06-09  Mg     1.70     06-09        Urine Studies:  Urinalysis Basic - ( 09 Jun 2025 04:30 )    Color: x / Appearance: x / SG: x / pH: x  Gluc: 92 mg/dL / Ketone: x  / Bili: x / Urobili: x   Blood: x / Protein: x / Nitrite: x   Leuk Esterase: x / RBC: x / WBC x   Sq Epi: x / Non Sq Epi: x / Bacteria: x      Assessment and Plan:   · Assessment	  48 yo female with no PMHx recently, yesterday diagnosed with 3 large uterine fibroids  ROMAN in light of kidney stones and mild hydro but also in light of continued NSAIDS .  Suspect the later the cause and not the kidney stones or hydro.  Mult kidney stones may have been the cause of abd pain vs the fibroids    Plan:    1 Renal- no objection to monitor renal fxn off IVFs at this time; azotemia improving.  Lytes acceptable  Trend creatinine, down trending, daily bmp  She is in ATN and this will take time to resolve   Kidney stone eval as outpt and when out of renal failure   We can defer renal ultrasound at this time given azotemia improving   2 CVS- Not in heart failure, C/w norvasc 5 mg po daily- BP acceptable   3 ID- IV abx for UTI   4 GI-Protonix can be cont  5 Anemia- IV Iron x 2 doses . Hgb better today.  Continue to trend; transfuse per primary team     Awaiting resolution of ROMAN before surgery     Discussed with  at bedside    Anahi Slater NP  [facility]   [phone]      Patient seen and examined in bed.  No new events.    REVIEW OF SYSTEMS:  As per HPI, otherwise 8 full 10 ROS were unremarkable  .  MEDICATIONS  (STANDING):  amLODIPine   Tablet 5 milliGRAM(s) Oral daily  cefTRIAXone   IVPB 1000 milliGRAM(s) IV Intermittent every 24 hours  folic acid 1 milliGRAM(s) Oral daily  pantoprazole  Injectable 40 milliGRAM(s) IV Push two times a day  polyethylene glycol 3350 17 Gram(s) Oral daily  sodium chloride 0.45% 1000 milliLiter(s) (60 mL/Hr) IV Continuous <Continuous>      VITAL:  T(C): , Max: 36.8 (06-08-25 @ 16:00)  T(F): , Max: 98.3 (06-09-25 @ 06:08)  HR: 84 (06-09-25 @ 06:08)  BP: 124/80 (06-09-25 @ 06:08)  BP(mean): --  RR: 18 (06-09-25 @ 06:08)  SpO2: 95% (06-09-25 @ 06:08)  Wt(kg): --    I and O's:        PHYSICAL EXAM:    Constitutional: NAD  HEENT: PERRLA, EOMI,  MMM  Neck: No LAD, No JVD  Respiratory: CTAB  Cardiovascular: S1 and S2  Gastrointestinal: BS+, soft, NT/ND  Extremities: No peripheral edema  Neurological: A/O x 3, no focal deficits  Psychiatric: Normal mood, normal affect  : No Moyer  Skin: No rashes  Access: Not applicable    LABS:                        7.8    11.80 )-----------( 648      ( 09 Jun 2025 04:30 )             24.8     06-09    140  |  100  |  48[H]  ----------------------------<  92  3.8   |  24  |  4.18[H]    Ca    9.5      09 Jun 2025 04:30  Phos  4.5     06-09  Mg     1.70     06-09        Urine Studies:  Urinalysis Basic - ( 09 Jun 2025 04:30 )    Color: x / Appearance: x / SG: x / pH: x  Gluc: 92 mg/dL / Ketone: x  / Bili: x / Urobili: x   Blood: x / Protein: x / Nitrite: x   Leuk Esterase: x / RBC: x / WBC x   Sq Epi: x / Non Sq Epi: x / Bacteria: x     Anahi Slater NP  [facility]   [phone]

## 2025-06-09 NOTE — PROGRESS NOTE ADULT - PROBLEM SELECTOR PLAN 1
resolving   creatinine improved 4.1 today  epi tenderness resolved s/p IV pantoprazole x 2 days  already improved

## 2025-06-09 NOTE — CHART NOTE - NSCHARTNOTEFT_GEN_A_CORE
Called by RN regarding pt c/o LUE antecubital pain, swelling & redness at site of prior IV that was removed day prior. + focal tenderness in AC, with mild edema/ warmth. Discussed findings with attending, will order Vanco 1gm x1 & doxy 100mg BID x 5 days.

## 2025-06-09 NOTE — PROGRESS NOTE ADULT - SUBJECTIVE AND OBJECTIVE BOX
Patient is a 49y old  Female who presents with a chief complaint of abnormal labs and abdominal pain (09 Jun 2025 08:45)      DATE OF SERVICE: 06-09-25 @ 09:53    SUBJECTIVE / OVERNIGHT EVENTS: overnight events noted    ROS:  Resp: No cough no sputum production  CVS: No chest pain no palpitations no orthopnea  GI: no N/V/D   at bedside     MEDICATIONS  (STANDING):  amLODIPine   Tablet 5 milliGRAM(s) Oral daily  cefTRIAXone   IVPB 1000 milliGRAM(s) IV Intermittent every 24 hours  folic acid 1 milliGRAM(s) Oral daily  pantoprazole  Injectable 40 milliGRAM(s) IV Push two times a day  polyethylene glycol 3350 17 Gram(s) Oral daily    MEDICATIONS  (PRN):  acetaminophen     Tablet .. 1000 milliGRAM(s) Oral every 6 hours PRN Temp greater or equal to 38C (100.4F), Mild Pain (1 - 3)        CAPILLARY BLOOD GLUCOSE        I&O's Summary      Vital Signs Last 24 Hrs  T(C): 36.8 (09 Jun 2025 06:08), Max: 36.8 (08 Jun 2025 16:00)  T(F): 98.3 (09 Jun 2025 06:08), Max: 98.3 (09 Jun 2025 06:08)  HR: 84 (09 Jun 2025 06:08) (84 - 97)  BP: 124/80 (09 Jun 2025 06:08) (100/68 - 133/89)  BP(mean): --  RR: 18 (09 Jun 2025 06:08) (16 - 18)  SpO2: 95% (09 Jun 2025 06:08) (95% - 100%)    PHYSICAL EXAM:   Lungs: clear  CVS: S1 S2 systolic murmur   Abdomen: less tenderness epigastrium   NO CVA tenderness   Neuro: AO x 3 nonfocal   Ext: no edema    LABS:                        7.8    11.80 )-----------( 648      ( 09 Jun 2025 04:30 )             24.8     06-09    140  |  100  |  48[H]  ----------------------------<  92  3.8   |  24  |  4.18[H]    Ca    9.5      09 Jun 2025 04:30  Phos  4.5     06-09  Mg     1.70     06-09            Urinalysis Basic - ( 09 Jun 2025 04:30 )    Color: x / Appearance: x / SG: x / pH: x  Gluc: 92 mg/dL / Ketone: x  / Bili: x / Urobili: x   Blood: x / Protein: x / Nitrite: x   Leuk Esterase: x / RBC: x / WBC x   Sq Epi: x / Non Sq Epi: x / Bacteria: x          All consultant(s) notes reviewed and care discussed with other providers        Contact Number, Dr Campbell [phone]

## 2025-06-09 NOTE — PROGRESS NOTE ADULT - PROBLEM SELECTOR PLAN 3
ferritin was low ~ 1 year ago per PCP records  will continue to monitor  hemoglobin stable > 7 no need for transfusion

## 2025-06-10 ENCOUNTER — TRANSCRIPTION ENCOUNTER (OUTPATIENT)
Age: 50
End: 2025-06-10

## 2025-06-10 VITALS
TEMPERATURE: 98 F | DIASTOLIC BLOOD PRESSURE: 76 MMHG | HEART RATE: 87 BPM | SYSTOLIC BLOOD PRESSURE: 108 MMHG | OXYGEN SATURATION: 100 % | RESPIRATION RATE: 18 BRPM

## 2025-06-10 DIAGNOSIS — R79.9 ABNORMAL FINDING OF BLOOD CHEMISTRY, UNSPECIFIED: ICD-10-CM

## 2025-06-10 DIAGNOSIS — I80.9 PHLEBITIS AND THROMBOPHLEBITIS OF UNSPECIFIED SITE: ICD-10-CM

## 2025-06-10 LAB
ALBUMIN SERPL ELPH-MCNC: 3.4 G/DL — SIGNIFICANT CHANGE UP (ref 3.3–5)
ALP SERPL-CCNC: 73 U/L — SIGNIFICANT CHANGE UP (ref 40–120)
ALT FLD-CCNC: 5 U/L — SIGNIFICANT CHANGE UP (ref 4–33)
ANION GAP SERPL CALC-SCNC: 15 MMOL/L — HIGH (ref 7–14)
AST SERPL-CCNC: 7 U/L — SIGNIFICANT CHANGE UP (ref 4–32)
BILIRUB SERPL-MCNC: <0.2 MG/DL — SIGNIFICANT CHANGE UP (ref 0.2–1.2)
BUN SERPL-MCNC: 42 MG/DL — HIGH (ref 7–23)
CALCIUM SERPL-MCNC: 9.9 MG/DL — SIGNIFICANT CHANGE UP (ref 8.4–10.5)
CHLORIDE SERPL-SCNC: 98 MMOL/L — SIGNIFICANT CHANGE UP (ref 98–107)
CO2 SERPL-SCNC: 25 MMOL/L — SIGNIFICANT CHANGE UP (ref 22–31)
CREAT SERPL-MCNC: 3.72 MG/DL — HIGH (ref 0.5–1.3)
EGFR: 14 ML/MIN/1.73M2 — LOW
EGFR: 14 ML/MIN/1.73M2 — LOW
GLUCOSE SERPL-MCNC: 96 MG/DL — SIGNIFICANT CHANGE UP (ref 70–99)
HCT VFR BLD CALC: 26.3 % — LOW (ref 34.5–45)
HGB BLD-MCNC: 8.2 G/DL — LOW (ref 11.5–15.5)
MAGNESIUM SERPL-MCNC: 1.7 MG/DL — SIGNIFICANT CHANGE UP (ref 1.6–2.6)
MCHC RBC-ENTMCNC: 23.1 PG — LOW (ref 27–34)
MCHC RBC-ENTMCNC: 31.2 G/DL — LOW (ref 32–36)
MCV RBC AUTO: 74.1 FL — LOW (ref 80–100)
NRBC # BLD AUTO: 0 K/UL — SIGNIFICANT CHANGE UP (ref 0–0)
NRBC # FLD: 0 K/UL — SIGNIFICANT CHANGE UP (ref 0–0)
NRBC BLD AUTO-RTO: 0 /100 WBCS — SIGNIFICANT CHANGE UP (ref 0–0)
PHOSPHATE SERPL-MCNC: 4.8 MG/DL — HIGH (ref 2.5–4.5)
PLATELET # BLD AUTO: 683 K/UL — HIGH (ref 150–400)
POTASSIUM SERPL-MCNC: 3.6 MMOL/L — SIGNIFICANT CHANGE UP (ref 3.5–5.3)
POTASSIUM SERPL-SCNC: 3.6 MMOL/L — SIGNIFICANT CHANGE UP (ref 3.5–5.3)
PROT SERPL-MCNC: 8.5 G/DL — HIGH (ref 6–8.3)
RBC # BLD: 3.55 M/UL — LOW (ref 3.8–5.2)
RBC # FLD: 19.2 % — HIGH (ref 10.3–14.5)
SODIUM SERPL-SCNC: 138 MMOL/L — SIGNIFICANT CHANGE UP (ref 135–145)
WBC # BLD: 13.9 K/UL — HIGH (ref 3.8–10.5)
WBC # FLD AUTO: 13.9 K/UL — HIGH (ref 3.8–10.5)

## 2025-06-10 RX ORDER — POLYETHYLENE GLYCOL 3350 17 G/17G
17 POWDER, FOR SOLUTION ORAL
Qty: 0 | Refills: 0 | DISCHARGE
Start: 2025-06-10

## 2025-06-10 RX ORDER — DOXYCYCLINE HYCLATE 100 MG
1 TABLET ORAL
Qty: 10 | Refills: 0
Start: 2025-06-10 | End: 2025-06-14

## 2025-06-10 RX ORDER — ACETAMINOPHEN 500 MG/5ML
2 LIQUID (ML) ORAL
Qty: 0 | Refills: 0 | DISCHARGE
Start: 2025-06-10

## 2025-06-10 RX ADMIN — AMLODIPINE BESYLATE 5 MILLIGRAM(S): 10 TABLET ORAL at 05:11

## 2025-06-10 RX ADMIN — Medication 40 MILLIGRAM(S): at 05:14

## 2025-06-10 RX ADMIN — FOLIC ACID 1 MILLIGRAM(S): 1 TABLET ORAL at 11:08

## 2025-06-10 RX ADMIN — Medication 1000 MILLIGRAM(S): at 05:11

## 2025-06-10 RX ADMIN — Medication 100 MILLIGRAM(S): at 11:03

## 2025-06-10 RX ADMIN — CEFTRIAXONE 100 MILLIGRAM(S): 500 INJECTION, POWDER, FOR SOLUTION INTRAMUSCULAR; INTRAVENOUS at 12:18

## 2025-06-10 NOTE — DISCHARGE NOTE NURSING/CASE MANAGEMENT/SOCIAL WORK - FINANCIAL ASSISTANCE
[facility] provides services at a reduced cost to those who are determined to be eligible through [facility]’s financial assistance program. Information regarding [facility]’s financial assistance program can be found by going to [email or web address] or by calling [phone].

## 2025-06-10 NOTE — PROGRESS NOTE ADULT - PROBLEM SELECTOR PLAN 5
continue amlodipine with hold parameters ferritin was low ~ 1 year ago per PCP records  will continue to monitor  hemoglobin stable > 8 no need for transfusion

## 2025-06-10 NOTE — PROGRESS NOTE ADULT - PROBLEM SELECTOR PLAN 1
resolving   creatinine improved further to 3.7  anticipate near complete recovery  avoidance of OTC pain meds reinforced at length   continue pantoprazole x 1 month  follow up nephrology Dr Boyd in 1 - 2 weeks

## 2025-06-10 NOTE — DISCHARGE NOTE PROVIDER - HOSPITAL COURSE
50 yo female with no PMH recently, yesterday diagnosed with 3 large uterine fibroids. Patient was seen by her GYN as an outpatient due to increasing pelvic pain discovered to have ROMAN severe anemia as well as likely UTI with likely associated sepsis       Problem/Plan - 1:  ·  Problem: ROMAN (acute kidney injury).   ·  Plan: resolving   creatinine improved further to 3.7  anticipate near complete recovery  avoidance of OTC pain meds reinforced at length   continue pantoprazole x 1 month  follow up nephrology Dr Boyd in 1 - 2 weeks.     Problem/Plan - 2:  ·  Problem: Sepsis due to gram-negative UTI.   ·  Plan: will continue ceftriaxone IV for complicated UTI day 5 today last dose prior to discharge.     Problem/Plan - 3:  ·  Problem: Phlebitis.   ·  Plan: s/p vancomycin x 1 yesterday  much improved per patient  complete additional 5 days of doxycycline as outpatient po.     Problem/Plan - 4:  ·  Problem: Severe anemia.   ·  Plan: ferritin was low ~ 1 year ago per PCP records  will continue to monitor  hemoglobin stable > 8 no need for transfusion.     Problem/Plan - 5:  ·  Problem: HTN (hypertension).   ·  Plan: continue amlodipine on discharge 48 yo female with no PMH recently, yesterday diagnosed with 3 large uterine fibroids. Patient was seen by her GYN as an outpatient due to increasing pelvic pain discovered to have ROMAN severe anemia as well as likely UTI with likely associated sepsis       Problem/Plan - 1:  ·  Problem: ROMAN (acute kidney injury).   ·  Plan: resolving   creatinine improved further to 3.7  anticipate near complete recovery  avoidance of OTC pain meds reinforced at length   continue pantoprazole x 1 month  follow up nephrology Dr Boyd in 1 - 2 weeks.     Problem/Plan - 2:  ·  Problem: Sepsis due to gram-negative UTI.   ·  Plan: will continue ceftriaxone IV for complicated UTI day 5 today last dose prior to discharge.     Problem/Plan - 3:  ·  Problem: Phlebitis.   ·  Plan: s/p vancomycin x 1 yesterday  much improved per patient  complete additional 5 days of doxycycline as outpatient po.     Problem/Plan - 4:  ·  Problem: Severe anemia.   ·  Plan: ferritin was low ~ 1 year ago per PCP records  will continue to monitor  hemoglobin stable > 8 no need for transfusion.     Problem/Plan - 5:  ·  Problem: HTN (hypertension).   ·  Plan: continue amlodipine on discharge    Problem 5 : abdominal pain  likely secondary to gastritis secondary to NSAIDs as o[  continue pantoprazole   resolved at time of discharge

## 2025-06-10 NOTE — DISCHARGE NOTE NURSING/CASE MANAGEMENT/SOCIAL WORK - PATIENT PORTAL LINK FT
You can access the FollowMyHealth Patient Portal offered by [facility] by registering at the following website: [email or web address]. By joining [facility]’s FollowMyHealth portal, you will also be able to view your health information using other applications (apps) compatible with our system.

## 2025-06-10 NOTE — PROGRESS NOTE ADULT - PROBLEM SELECTOR PROBLEM 1
ROMAN (acute kidney injury)

## 2025-06-10 NOTE — PROGRESS NOTE ADULT - NSPROGADDITIONALINFOA_GEN_ALL_CORE
discussed with patient, expresses understanding of treatment plans.  discussed with son at bedside
discussed with renal  discharge tomorrow if creatinine improved off IVF
discussed with patient's son at the bedside in detail
stable for discharge  PCP's office  updated     encounter 50 min including PE, progress note, medication adjustment and d/w ACP and patient

## 2025-06-10 NOTE — PROGRESS NOTE ADULT - PROBLEM SELECTOR PROBLEM 2
Sepsis due to gram-negative UTI

## 2025-06-10 NOTE — DISCHARGE NOTE PROVIDER - NSDCMRMEDTOKEN_GEN_ALL_CORE_FT
acetaminophen 500 mg oral tablet: 2 tab(s) orally every 6 hours As needed Temp greater or equal to 38C (100.4F), Mild Pain (1 - 3)  amLODIPine 5 mg oral tablet: 1 tab(s) orally once a day  doxycycline monohydrate 100 mg oral capsule: 1 cap(s) orally 2 times a day  ferrous sulfate: 325 milligram(s) orally once a day  folic acid: 1 milligram(s) orally once a day  pantoprazole 40 mg oral delayed release tablet: 1 tab(s) orally once a day  polyethylene glycol 3350 oral powder for reconstitution: 17 gram(s) orally once a day

## 2025-06-10 NOTE — DISCHARGE NOTE PROVIDER - CARE PROVIDERS DIRECT ADDRESSES
[email or web address]

## 2025-06-10 NOTE — PROGRESS NOTE ADULT - PROBLEM SELECTOR PLAN 3
s/p vancomycin x 1 yesterday  much improved per patient  complete additional 5 days of doxycycline as outpatient po

## 2025-06-10 NOTE — PROGRESS NOTE ADULT - PROVIDER SPECIALTY LIST ADULT
Nephrology
Internal Medicine
Nephrology
Internal Medicine

## 2025-06-10 NOTE — PROGRESS NOTE ADULT - PROBLEM SELECTOR PLAN 4
ferritin was low ~ 1 year ago per PCP records  will continue to monitor  hemoglobin stable > 8 no need for transfusion likely secondary to severe gastritis on admission  secondary to NSAID use  continue pantoprazole   now completely resolved

## 2025-06-10 NOTE — PROGRESS NOTE ADULT - SUBJECTIVE AND OBJECTIVE BOX
Patient seen and examined    REVIEW OF SYSTEMS:  As per HPI, otherwise 8 full 10 ROS were unremarkable    MEDICATIONS  (STANDING):  amLODIPine   Tablet 5 milliGRAM(s) Oral daily  cefTRIAXone   IVPB 1000 milliGRAM(s) IV Intermittent every 24 hours  doxycycline IVPB 100 milliGRAM(s) IV Intermittent every 12 hours  folic acid 1 milliGRAM(s) Oral daily  pantoprazole  Injectable 40 milliGRAM(s) IV Push two times a day  polyethylene glycol 3350 17 Gram(s) Oral daily      VITAL:  T(C): , Max: 37.2 (06-09-25 @ 14:00)  T(F): , Max: 98.9 (06-09-25 @ 14:00)  HR: 60 (06-10-25 @ 08:00)  BP: 100/69 (06-10-25 @ 08:00)  BP(mean): --  RR: 16 (06-10-25 @ 08:00)  SpO2: 98% (06-10-25 @ 08:00)  Wt(kg): --    I and O's:    06-09 @ 07:01  -  06-10 @ 07:00  --------------------------------------------------------  IN: 540 mL / OUT: 0 mL / NET: 540 mL          PHYSICAL EXAM:    Constitutional: NAD  HEENT: PERRLA, EOMI,  MMM  Neck: No LAD, No JVD  Respiratory: CTAB  Cardiovascular: S1 and S2  Gastrointestinal: BS+, soft, NT/ND  Extremities: No peripheral edema  Neurological: A/O x 3, no focal deficits  Psychiatric: Normal mood, normal affect  : No Moyer  Skin: No rashes  Access: Not applicable    LABS:                        8.2    13.90 )-----------( 683      ( 10 Donavan 2025 04:45 )             26.3     06-10    138  |  98  |  42[H]  ----------------------------<  96  3.6   |  25  |  3.72[H]    Ca    9.9      10 Donavan 2025 04:45  Phos  4.8     06-10  Mg     1.70     06-10    TPro  8.5[H]  /  Alb  3.4  /  TBili  <0.2  /  DBili  x   /  AST  7   /  ALT  5   /  AlkPhos  73  06-10      Urine Studies:  Urinalysis Basic - ( 10 Donavan 2025 04:45 )    Color: x / Appearance: x / SG: x / pH: x  Gluc: 96 mg/dL / Ketone: x  / Bili: x / Urobili: x   Blood: x / Protein: x / Nitrite: x   Leuk Esterase: x / RBC: x / WBC x   Sq Epi: x / Non Sq Epi: x / Bacteria: x          Assessment and Plan:   · Assessment	    50 yo female with no PMHx recently, yesterday diagnosed with 3 large uterine fibroids  ROMAN in light of kidney stones and mild hydro but also in light of continued NSAIDS .  Suspect the later the cause and not the kidney stones or hydro.  Mult kidney stones may have been the cause of abd pain vs the fibroids    Plan:    1 Renal- Azotemia continues to improve.  Lytes acceptable  Trend creatinine, down trending, daily bmp  She is in ATN and this will take time to resolve   Kidney stone eval as outpt and when out of renal failure   We can defer renal ultrasound at this time given azotemia improving   2 CVS- Not in heart failure, C/w norvasc 5 mg po daily- BP acceptable   3 ID- IV abx for UTI   4 GI-Protonix can be cont  5 Anemia- IV Iron x 2 doses . Hgb better today.  Continue to trend    Awaiting resolution of ROMAN before surgery     Discussed with  at bedside  Discharge planning underway.  No renal objection for discharge with closely monitoring.  Have patient f/u with PCP in 1 week and urology follow up as well.    Anahi Slater NP  [facility]   [phone]

## 2025-06-10 NOTE — PROGRESS NOTE ADULT - SUBJECTIVE AND OBJECTIVE BOX
Patient is a 49y old  Female who presents with a chief complaint of abnormal labs and abdominal pain (09 Jun 2025 09:53)      DATE OF SERVICE: 06-10-25 @ 09:06    SUBJECTIVE / OVERNIGHT EVENTS: overnight events noted    ROS:  Resp: No cough no sputum production  CVS: No chest pain no palpitations no orthopnea  GI: no N/V/D   at bedside   left arm phlebitis much improved     MEDICATIONS  (STANDING):  amLODIPine   Tablet 5 milliGRAM(s) Oral daily  cefTRIAXone   IVPB 1000 milliGRAM(s) IV Intermittent every 24 hours  doxycycline IVPB 100 milliGRAM(s) IV Intermittent every 12 hours  folic acid 1 milliGRAM(s) Oral daily  pantoprazole  Injectable 40 milliGRAM(s) IV Push two times a day  polyethylene glycol 3350 17 Gram(s) Oral daily    MEDICATIONS  (PRN):  acetaminophen     Tablet .. 1000 milliGRAM(s) Oral every 6 hours PRN Temp greater or equal to 38C (100.4F), Mild Pain (1 - 3)        CAPILLARY BLOOD GLUCOSE        I&O's Summary    09 Jun 2025 07:01  -  10 Donavan 2025 07:00  --------------------------------------------------------  IN: 540 mL / OUT: 0 mL / NET: 540 mL        Vital Signs Last 24 Hrs  T(C): 36.9 (10 Donavan 2025 04:00), Max: 37.2 (09 Jun 2025 14:00)  T(F): 98.4 (10 Donavan 2025 04:00), Max: 98.9 (09 Jun 2025 14:00)  HR: 84 (10 Donavan 2025 05:00) (80 - 95)  BP: 132/86 (10 Donavan 2025 05:00) (116/80 - 136/78)  BP(mean): --  RR: 18 (10 Donavan 2025 04:00) (18 - 18)  SpO2: 100% (10 Donavan 2025 04:00) (98% - 100%)    PHYSICAL EXAM:   Lungs: clear  CVS: S1 S2 systolic murmur   Abdomen: no tenderness  Neuro: AO x 3 nonfocal   Ext: no edema    LABS:                        8.2    13.90 )-----------( 683      ( 10 Donavan 2025 04:45 )             26.3     06-10    138  |  98  |  42[H]  ----------------------------<  96  3.6   |  25  |  3.72[H]    Ca    9.9      10 Donavan 2025 04:45  Phos  4.8     06-10  Mg     1.70     06-10    TPro  8.5[H]  /  Alb  3.4  /  TBili  <0.2  /  DBili  x   /  AST  7   /  ALT  5   /  AlkPhos  73  06-10          Urinalysis Basic - ( 10 Donavan 2025 04:45 )    Color: x / Appearance: x / SG: x / pH: x  Gluc: 96 mg/dL / Ketone: x  / Bili: x / Urobili: x   Blood: x / Protein: x / Nitrite: x   Leuk Esterase: x / RBC: x / WBC x   Sq Epi: x / Non Sq Epi: x / Bacteria: x          All consultant(s) notes reviewed and care discussed with other providers        Contact Number, Dr Campbell [phone]

## 2025-06-10 NOTE — DISCHARGE NOTE PROVIDER - NSDCCPCAREPLAN_GEN_ALL_CORE_FT
PRINCIPAL DISCHARGE DIAGNOSIS  Diagnosis: Sepsis due to gram-negative UTI  Assessment and Plan of Treatment: antibiotics completed in the hospital  follow up PCP Dr Gilmer Álvarez 2 weeks      SECONDARY DISCHARGE DIAGNOSES  Diagnosis: ROMAN (acute kidney injury)  Assessment and Plan of Treatment: kidney function improved   follow up Dr Boyd nephrologist in 1 - 2 weeks    Diagnosis: Phlebitis  Assessment and Plan of Treatment: complete doxycycline as prescribed  warm soaks left arm /5 times a day    Diagnosis: Anemia  Assessment and Plan of Treatment: follow up with Gyn as outpatient    Diagnosis: HTN (hypertension)  Assessment and Plan of Treatment: continue amlodipine

## 2025-06-10 NOTE — PROGRESS NOTE ADULT - PROBLEM SELECTOR PLAN 2
will continue ceftriaxone IV for likely  complicated UTI day 4 today
awaiting urine culture  will continue ceftriaxone IV for likely  complicated UTI day 3 today    urine culture suggestive of procurement contamination however will complete 5 days
awaiting urine culture  will continue ceftriaxone IV for likely  complicated UTI  day 2 today  urine culture testing
will continue ceftriaxone IV for complicated UTI day 5 today last dose prior to discharge

## 2025-06-10 NOTE — PROGRESS NOTE ADULT - ASSESSMENT
50 yo female with no PMHx recently, yesterday diagnosed with 3 large uterine fibroids  ROMAN in light of kidney stones and mild hydro but also in light of continued NSAIDS .  Suspect the later the cause and not the kidney stones or hydro.  If ROMAN does not improve by monday then renal scan   Mult kidney stones may have been the cause of abd pain vs the fibroids    1 Renal-  IVF to 1/2 ns with 75 meq /nahco3 at 75cc hr...tomorrow will reduce to 60cc/hr   Trend creatinine   She is in ATN and this will take time to resolve   Kidney stone eval as outpt and when out of renal failure   2 CVS- Not in heart failure   3 ID- IV abx for UTI   4 GI-Protonix can be cont    Awaiting resolution of ROMAN before surgery   DW Dr Campbell and son     Sayed [facility]   [phone] 
48 yo female with no PMHx recently, yesterday diagnosed with 3 large uterine fibroids  ROMAN in light of kidney stones and mild hydro but also in light of continued NSAIDS .  Suspect the later the cause and not the kidney stones or hydro.  If ROMAN does not improve by monday then renal scan   Mult kidney stones may have been the cause of abd pain vs the fibroids    Plan:    1 Renal- C/w IVF 1/2 ns with 75 meq /nahco3 at 75cc hr...will reduce to 60cc/hr today   Trend creatinine, down trending, daily bmp  She is in ATN and this will take time to resolve   Kidney stone eval as outpt and when out of renal failure   2 CVS- Not in heart failure, C/w norvasc 5 mg po daily   3 ID- IV abx for UTI   4 GI-Protonix can be cont  5 Anemia- IV Iron x 2 doses . Hgb 7.2    Awaiting resolution of ROMAN before surgery       Kassi Loyola NP  [facility]   [phone]   
  50 yo female with no PMHx recently, yesterday diagnosed with 3 large uterine fibroids  ROMAN in light of kidney stones and mild hydro but also in light of continued NSAIDS .  Suspect the later the cause and not the kidney stones or hydro.  Mult kidney stones may have been the cause of abd pain vs the fibroids    Plan:    1 Renal- no objection to monitor renal fxn off IVFs at this time; azotemia improving.  Lytes acceptable  Trend creatinine, down trending, daily bmp  She is in ATN and this will take time to resolve   Kidney stone eval as outpt and when out of renal failure   We can defer renal ultrasound at this time given azotemia improving   2 CVS- Not in heart failure, C/w norvasc 5 mg po daily- BP acceptable   3 ID- IV abx for UTI   4 GI-Protonix can be cont  5 Anemia- IV Iron x 2 doses . Hgb better today.  Continue to trend; transfuse per primary team     Awaiting resolution of ROMAN before surgery     Discussed with  at bedside  If the creatinine is down in am off tIVF then dc planning     Sayed [facility]   [phone]   
48 yo female with no PMH recently, yesterday diagnosed with 3 large uterine fibroids. Patient was seen by her GYN as an outpatient due to increasing pelvic pain discovered to have ROMAN severe anemia as well as likely UTI with likely associated sepsis
50 yo female with no PMH recently, yesterday diagnosed with 3 large uterine fibroids. Patient was seen by her GYN as an outpatient due to increasing pelvic pain discovered to have ROMAN severe anemia as well as likely UTI with likely associated sepsis

## 2025-06-10 NOTE — DISCHARGE NOTE PROVIDER - CARE PROVIDER_API CALL
Jailyn Boyd  Nephrology  [address], [address]  [place] [address]  Phone: [phone]  Fax: [phone]  Follow Up Time:     Gilmer Álvarez  Internal Medicine  [address] [place] [address]  Phone: [phone]  Fax: [phone]  Follow Up Time:

## 2025-06-10 NOTE — PROGRESS NOTE ADULT - REASON FOR ADMISSION
abnormal labs and abdominal pain

## 2025-07-29 PROBLEM — I10 ESSENTIAL (PRIMARY) HYPERTENSION: Chronic | Status: ACTIVE | Noted: 2025-06-06

## 2025-07-29 PROBLEM — D64.9 ANEMIA, UNSPECIFIED: Chronic | Status: ACTIVE | Noted: 2025-06-06

## 2025-07-29 PROBLEM — D21.9 BENIGN NEOPLASM OF CONNECTIVE AND OTHER SOFT TISSUE, UNSPECIFIED: Chronic | Status: ACTIVE | Noted: 2025-06-06

## 2025-07-29 PROBLEM — E53.8 DEFICIENCY OF OTHER SPECIFIED B GROUP VITAMINS: Chronic | Status: ACTIVE | Noted: 2025-06-06

## 2025-08-04 ENCOUNTER — EMERGENCY (EMERGENCY)
Facility: HOSPITAL | Age: 50
LOS: 1 days | End: 2025-08-04
Attending: EMERGENCY MEDICINE | Admitting: EMERGENCY MEDICINE
Payer: MEDICAID

## 2025-08-04 VITALS
DIASTOLIC BLOOD PRESSURE: 74 MMHG | HEART RATE: 85 BPM | WEIGHT: 153 LBS | OXYGEN SATURATION: 100 % | HEIGHT: 63 IN | TEMPERATURE: 98 F | RESPIRATION RATE: 18 BRPM | SYSTOLIC BLOOD PRESSURE: 110 MMHG

## 2025-08-04 LAB
BASOPHILS # BLD AUTO: 0.08 K/UL — SIGNIFICANT CHANGE UP (ref 0–0.2)
BASOPHILS NFR BLD AUTO: 0.8 % — SIGNIFICANT CHANGE UP (ref 0–2)
EOSINOPHIL # BLD AUTO: 0.28 K/UL — SIGNIFICANT CHANGE UP (ref 0–0.5)
EOSINOPHIL NFR BLD AUTO: 2.9 % — SIGNIFICANT CHANGE UP (ref 0–6)
HCT VFR BLD CALC: 30.3 % — LOW (ref 34.5–45)
HGB BLD-MCNC: 9.5 G/DL — LOW (ref 11.5–15.5)
IMM GRANULOCYTES # BLD AUTO: 0.11 K/UL — HIGH (ref 0–0.07)
IMM GRANULOCYTES NFR BLD AUTO: 1.1 % — HIGH (ref 0–0.9)
LYMPHOCYTES # BLD AUTO: 2.25 K/UL — SIGNIFICANT CHANGE UP (ref 1–3.3)
LYMPHOCYTES NFR BLD AUTO: 23.1 % — SIGNIFICANT CHANGE UP (ref 13–44)
MCHC RBC-ENTMCNC: 24.4 PG — LOW (ref 27–34)
MCHC RBC-ENTMCNC: 31.4 G/DL — LOW (ref 32–36)
MCV RBC AUTO: 77.9 FL — LOW (ref 80–100)
MONOCYTES # BLD AUTO: 0.71 K/UL — SIGNIFICANT CHANGE UP (ref 0–0.9)
MONOCYTES NFR BLD AUTO: 7.3 % — SIGNIFICANT CHANGE UP (ref 2–14)
NEUTROPHILS # BLD AUTO: 6.31 K/UL — SIGNIFICANT CHANGE UP (ref 1.8–7.4)
NEUTROPHILS NFR BLD AUTO: 64.8 % — SIGNIFICANT CHANGE UP (ref 43–77)
NRBC # BLD AUTO: 0 K/UL — SIGNIFICANT CHANGE UP (ref 0–0)
NRBC # FLD: 0 K/UL — SIGNIFICANT CHANGE UP (ref 0–0)
NRBC BLD AUTO-RTO: 0 /100 WBCS — SIGNIFICANT CHANGE UP (ref 0–0)
PLATELET # BLD AUTO: 451 K/UL — HIGH (ref 150–400)
PMV BLD: 9.6 FL — SIGNIFICANT CHANGE UP (ref 7–13)
RBC # BLD: 3.89 M/UL — SIGNIFICANT CHANGE UP (ref 3.8–5.2)
RBC # FLD: 18.8 % — HIGH (ref 10.3–14.5)
WBC # BLD: 9.74 K/UL — SIGNIFICANT CHANGE UP (ref 3.8–10.5)
WBC # FLD AUTO: 9.74 K/UL — SIGNIFICANT CHANGE UP (ref 3.8–10.5)

## 2025-08-04 PROCEDURE — 99284 EMERGENCY DEPT VISIT MOD MDM: CPT

## 2025-08-04 RX ORDER — ACETAMINOPHEN 500 MG/5ML
1000 LIQUID (ML) ORAL ONCE
Refills: 0 | Status: COMPLETED | OUTPATIENT
Start: 2025-08-04 | End: 2025-08-04

## 2025-08-04 RX ADMIN — Medication 400 MILLIGRAM(S): at 23:19

## 2025-08-05 VITALS
DIASTOLIC BLOOD PRESSURE: 73 MMHG | HEART RATE: 60 BPM | RESPIRATION RATE: 18 BRPM | TEMPERATURE: 98 F | SYSTOLIC BLOOD PRESSURE: 110 MMHG | OXYGEN SATURATION: 100 %

## 2025-08-05 LAB
ALBUMIN SERPL ELPH-MCNC: 3.9 G/DL — SIGNIFICANT CHANGE UP (ref 3.3–5)
ALP SERPL-CCNC: 78 U/L — SIGNIFICANT CHANGE UP (ref 40–120)
ALT FLD-CCNC: 7 U/L — SIGNIFICANT CHANGE UP (ref 4–33)
ANION GAP SERPL CALC-SCNC: 15 MMOL/L — HIGH (ref 7–14)
APPEARANCE UR: ABNORMAL
AST SERPL-CCNC: 12 U/L — SIGNIFICANT CHANGE UP (ref 4–32)
BACTERIA # UR AUTO: ABNORMAL /HPF
BILIRUB SERPL-MCNC: 0.2 MG/DL — SIGNIFICANT CHANGE UP (ref 0.2–1.2)
BILIRUB UR-MCNC: NEGATIVE — SIGNIFICANT CHANGE UP
BUN SERPL-MCNC: 37 MG/DL — HIGH (ref 7–23)
CALCIUM SERPL-MCNC: 9.9 MG/DL — SIGNIFICANT CHANGE UP (ref 8.4–10.5)
CAST: 10 /LPF — HIGH (ref 0–4)
CHLORIDE SERPL-SCNC: 106 MMOL/L — SIGNIFICANT CHANGE UP (ref 98–107)
CO2 SERPL-SCNC: 17 MMOL/L — LOW (ref 22–31)
COLOR SPEC: YELLOW — SIGNIFICANT CHANGE UP
CREAT SERPL-MCNC: 3.26 MG/DL — HIGH (ref 0.5–1.3)
DIFF PNL FLD: ABNORMAL
EGFR: 17 ML/MIN/1.73M2 — LOW
EGFR: 17 ML/MIN/1.73M2 — LOW
GLUCOSE SERPL-MCNC: 99 MG/DL — SIGNIFICANT CHANGE UP (ref 70–99)
GLUCOSE UR QL: NEGATIVE MG/DL — SIGNIFICANT CHANGE UP
HCG SERPL-ACNC: <1 MIU/ML — SIGNIFICANT CHANGE UP
KETONES UR QL: NEGATIVE MG/DL — SIGNIFICANT CHANGE UP
LEUKOCYTE ESTERASE UR-ACNC: ABNORMAL
LIDOCAIN IGE QN: 59 U/L — SIGNIFICANT CHANGE UP (ref 7–60)
NITRITE UR-MCNC: NEGATIVE — SIGNIFICANT CHANGE UP
PH UR: 7.5 — SIGNIFICANT CHANGE UP (ref 5–8)
POTASSIUM SERPL-MCNC: 4.3 MMOL/L — SIGNIFICANT CHANGE UP (ref 3.5–5.3)
POTASSIUM SERPL-SCNC: 4.3 MMOL/L — SIGNIFICANT CHANGE UP (ref 3.5–5.3)
PROT SERPL-MCNC: 9.1 G/DL — HIGH (ref 6–8.3)
PROT UR-MCNC: 30 MG/DL
RBC CASTS # UR COMP ASSIST: 6 /HPF — HIGH (ref 0–4)
SODIUM SERPL-SCNC: 138 MMOL/L — SIGNIFICANT CHANGE UP (ref 135–145)
SP GR SPEC: 1.01 — SIGNIFICANT CHANGE UP (ref 1–1.03)
SQUAMOUS # UR AUTO: 18 /HPF — HIGH (ref 0–5)
TROPONIN T, HIGH SENSITIVITY RESULT: <6 NG/L — SIGNIFICANT CHANGE UP
UROBILINOGEN FLD QL: 0.2 MG/DL — SIGNIFICANT CHANGE UP (ref 0.2–1)
WBC UR QL: 193 /HPF — HIGH (ref 0–5)

## 2025-08-05 PROCEDURE — 74176 CT ABD & PELVIS W/O CONTRAST: CPT | Mod: 26

## 2025-08-05 PROCEDURE — 93010 ELECTROCARDIOGRAM REPORT: CPT

## 2025-08-05 PROCEDURE — 76700 US EXAM ABDOM COMPLETE: CPT | Mod: 26

## 2025-08-06 LAB
CULTURE RESULTS: SIGNIFICANT CHANGE UP
SPECIMEN SOURCE: SIGNIFICANT CHANGE UP

## 2025-08-12 ENCOUNTER — LABORATORY RESULT (OUTPATIENT)
Age: 50
End: 2025-08-12

## 2025-08-12 ENCOUNTER — APPOINTMENT (OUTPATIENT)
Dept: NEPHROLOGY | Facility: CLINIC | Age: 50
End: 2025-08-12
Payer: MEDICAID

## 2025-08-12 VITALS
DIASTOLIC BLOOD PRESSURE: 91 MMHG | OXYGEN SATURATION: 97 % | WEIGHT: 149 LBS | HEART RATE: 90 BPM | SYSTOLIC BLOOD PRESSURE: 123 MMHG | BODY MASS INDEX: 29.1 KG/M2

## 2025-08-12 DIAGNOSIS — Z83.3 FAMILY HISTORY OF DIABETES MELLITUS: ICD-10-CM

## 2025-08-12 DIAGNOSIS — N20.0 CALCULUS OF KIDNEY: ICD-10-CM

## 2025-08-12 DIAGNOSIS — D25.9 LEIOMYOMA OF UTERUS, UNSPECIFIED: ICD-10-CM

## 2025-08-12 DIAGNOSIS — N18.4 CHRONIC KIDNEY DISEASE, STAGE 4 (SEVERE): ICD-10-CM

## 2025-08-12 DIAGNOSIS — N39.0 URINARY TRACT INFECTION, SITE NOT SPECIFIED: ICD-10-CM

## 2025-08-12 PROCEDURE — 99205 OFFICE O/P NEW HI 60 MIN: CPT

## 2025-08-14 DIAGNOSIS — A49.9 URINARY TRACT INFECTION, SITE NOT SPECIFIED: ICD-10-CM

## 2025-08-14 DIAGNOSIS — N39.0 URINARY TRACT INFECTION, SITE NOT SPECIFIED: ICD-10-CM

## 2025-08-18 RX ORDER — AMOXICILLIN AND CLAVULANATE POTASSIUM 500; 125 MG/1; MG/1
500-125 TABLET, FILM COATED ORAL DAILY
Qty: 10 | Refills: 0 | Status: ACTIVE | COMMUNITY
Start: 2025-08-18 | End: 1900-01-01

## 2025-08-19 LAB
ALBUMIN SERPL ELPH-MCNC: 4 G/DL
ANA TITR SER: NEGATIVE
ANION GAP SERPL CALC-SCNC: 20 MMOL/L
APPEARANCE: ABNORMAL
BACTERIA UR CULT: ABNORMAL
BACTERIA UR CULT: NORMAL
BACTERIA: ABNORMAL /HPF
BASOPHILS # BLD AUTO: 0.09 K/UL
BASOPHILS NFR BLD AUTO: 0.9 %
BILIRUBIN URINE: NEGATIVE
BLOOD URINE: ABNORMAL
BUN SERPL-MCNC: 34 MG/DL
C3 SERPL-MCNC: 170 MG/DL
CALCIUM SERPL-MCNC: 10.2 MG/DL
CALCIUM SERPL-MCNC: 10.2 MG/DL
CAST: 43 /LPF
CHLORIDE SERPL-SCNC: 102 MMOL/L
CO2 SERPL-SCNC: 15 MMOL/L
COLOR: YELLOW
CREAT SERPL-MCNC: 3.63 MG/DL
CREAT SPEC-SCNC: 27 MG/DL
CREAT/PROT UR: 4.5 RATIO
DEPRECATED KAPPA LC FREE/LAMBDA SER: 0.84 RATIO
DSDNA AB SER-ACNC: 1 IU/ML
EGFRCR SERPLBLD CKD-EPI 2021: 15 ML/MIN/1.73M2
EOSINOPHIL # BLD AUTO: 0.24 K/UL
EOSINOPHIL NFR BLD AUTO: 2.3 %
EPITHELIAL CELLS: 5 /HPF
FINE GRANULAR CASTS: PRESENT
GLUCOSE QUALITATIVE U: NEGATIVE MG/DL
GLUCOSE SERPL-MCNC: 72 MG/DL
HBV SURFACE AB SER QL: REACTIVE
HBV SURFACE AG SER QL: NONREACTIVE
HCT VFR BLD CALC: 31.2 %
HCV AB SER QL: NONREACTIVE
HCV S/CO RATIO: 0.21 S/CO
HGB BLD-MCNC: 9.1 G/DL
HYALINE CASTS: PRESENT
IGA 24H UR QL IFE: NORMAL
IGA SERPL-MCNC: 181 MG/DL
IGG SERPL-MCNC: 2185 MG/DL
IGM SERPL-MCNC: 179 MG/DL
IMM GRANULOCYTES NFR BLD AUTO: 1.1 %
KAPPA LC CSF-MCNC: 11.71 MG/DL
KAPPA LC SERPL-MCNC: 9.86 MG/DL
KETONES URINE: NEGATIVE MG/DL
LEUKOCYTE ESTERASE URINE: ABNORMAL
LYMPHOCYTES # BLD AUTO: 2.27 K/UL
LYMPHOCYTES NFR BLD AUTO: 22 %
M PROTEIN SPEC IFE-MCNC: NORMAL
MAN DIFF?: NORMAL
MCHC RBC-ENTMCNC: 24.5 PG
MCHC RBC-ENTMCNC: 29.2 G/DL
MCV RBC AUTO: 84.1 FL
MICROSCOPIC-UA: NORMAL
MONOCYTES # BLD AUTO: 0.63 K/UL
MONOCYTES NFR BLD AUTO: 6.1 %
NEUTROPHILS # BLD AUTO: 6.97 K/UL
NEUTROPHILS NFR BLD AUTO: 67.6 %
NITRITE URINE: NEGATIVE
PARATHYROID HORMONE INTACT: 56 PG/ML
PH URINE: 8
PHOSPHATE SERPL-MCNC: 4.4 MG/DL
PLATELET # BLD AUTO: NORMAL K/UL
POTASSIUM SERPL-SCNC: 5.2 MMOL/L
PROT UR-MCNC: 121 MG/DL
PROTEIN URINE: 100 MG/DL
RBC # BLD: 3.71 M/UL
RBC # FLD: 18.7 %
RED BLOOD CELLS URINE: 0 /HPF
REVIEW: NORMAL
SODIUM SERPL-SCNC: 137 MMOL/L
SPECIFIC GRAVITY URINE: 1.01
UROBILINOGEN URINE: 0.2 MG/DL
WBC # FLD AUTO: 10.31 K/UL
WHITE BLOOD CELLS URINE: 171 /HPF

## 2025-08-28 ENCOUNTER — APPOINTMENT (OUTPATIENT)
Dept: NEPHROLOGY | Facility: CLINIC | Age: 50
End: 2025-08-28
Payer: MEDICAID

## 2025-08-28 VITALS
BODY MASS INDEX: 30.08 KG/M2 | HEART RATE: 71 BPM | WEIGHT: 154 LBS | SYSTOLIC BLOOD PRESSURE: 128 MMHG | DIASTOLIC BLOOD PRESSURE: 85 MMHG | OXYGEN SATURATION: 96 %

## 2025-08-28 DIAGNOSIS — D63.1 CHRONIC KIDNEY DISEASE, UNSPECIFIED: ICD-10-CM

## 2025-08-28 DIAGNOSIS — N39.0 URINARY TRACT INFECTION, SITE NOT SPECIFIED: ICD-10-CM

## 2025-08-28 DIAGNOSIS — A49.9 URINARY TRACT INFECTION, SITE NOT SPECIFIED: ICD-10-CM

## 2025-08-28 DIAGNOSIS — N18.9 CHRONIC KIDNEY DISEASE, UNSPECIFIED: ICD-10-CM

## 2025-08-28 DIAGNOSIS — E87.20 ACIDOSIS, UNSPECIFIED: ICD-10-CM

## 2025-08-28 PROCEDURE — 99214 OFFICE O/P EST MOD 30 MIN: CPT

## 2025-08-29 ENCOUNTER — APPOINTMENT (OUTPATIENT)
Dept: UROLOGY | Facility: CLINIC | Age: 50
End: 2025-08-29

## 2025-08-29 VITALS
SYSTOLIC BLOOD PRESSURE: 103 MMHG | OXYGEN SATURATION: 98 % | WEIGHT: 154 LBS | DIASTOLIC BLOOD PRESSURE: 62 MMHG | HEIGHT: 60 IN | BODY MASS INDEX: 30.23 KG/M2 | TEMPERATURE: 97.7 F | HEART RATE: 75 BPM

## 2025-08-29 DIAGNOSIS — Z83.3 FAMILY HISTORY OF DIABETES MELLITUS: ICD-10-CM

## 2025-08-29 LAB
ALBUMIN SERPL ELPH-MCNC: 4.1 G/DL
ANION GAP SERPL CALC-SCNC: 18 MMOL/L
APPEARANCE: CLEAR
BACTERIA: NEGATIVE /HPF
BASOPHILS # BLD AUTO: 0.09 K/UL
BASOPHILS NFR BLD AUTO: 0.9 %
BILIRUBIN URINE: NEGATIVE
BLOOD URINE: ABNORMAL
BUN SERPL-MCNC: 31 MG/DL
CALCIUM SERPL-MCNC: 8.9 MG/DL
CAST: 0 /LPF
CHLORIDE SERPL-SCNC: 102 MMOL/L
CO2 SERPL-SCNC: 18 MMOL/L
COLOR: YELLOW
CREAT SERPL-MCNC: 2.66 MG/DL
EGFRCR SERPLBLD CKD-EPI 2021: 21 ML/MIN/1.73M2
EOSINOPHIL # BLD AUTO: 0.28 K/UL
EOSINOPHIL NFR BLD AUTO: 2.8 %
EPITHELIAL CELLS: 7 /HPF
FERRITIN SERPL-MCNC: 280 NG/ML
GLUCOSE QUALITATIVE U: NEGATIVE MG/DL
GLUCOSE SERPL-MCNC: 70 MG/DL
HCT VFR BLD CALC: 28.7 %
HGB BLD-MCNC: 8.9 G/DL
IMM GRANULOCYTES NFR BLD AUTO: 1.5 %
IRON SATN MFR SERPL: 15 %
IRON SERPL-MCNC: 40 UG/DL
KETONES URINE: NEGATIVE MG/DL
LEUKOCYTE ESTERASE URINE: ABNORMAL
LYMPHOCYTES # BLD AUTO: 2.71 K/UL
LYMPHOCYTES NFR BLD AUTO: 26.7 %
MAN DIFF?: NORMAL
MCHC RBC-ENTMCNC: 25 PG
MCHC RBC-ENTMCNC: 31 G/DL
MCV RBC AUTO: 80.6 FL
MICROSCOPIC-UA: NORMAL
MONOCYTES # BLD AUTO: 0.78 K/UL
MONOCYTES NFR BLD AUTO: 7.7 %
NEUTROPHILS # BLD AUTO: 6.15 K/UL
NEUTROPHILS NFR BLD AUTO: 60.4 %
NITRITE URINE: NEGATIVE
PH URINE: 6.5
PHOSPHATE SERPL-MCNC: 3.2 MG/DL
PLATELET # BLD AUTO: 451 K/UL
POTASSIUM SERPL-SCNC: 4.7 MMOL/L
PROTEIN URINE: NEGATIVE MG/DL
RBC # BLD: 3.56 M/UL
RBC # FLD: 17.8 %
RED BLOOD CELLS URINE: 0 /HPF
SODIUM SERPL-SCNC: 138 MMOL/L
SPECIFIC GRAVITY URINE: 1.01
TIBC SERPL-MCNC: 271 UG/DL
UIBC SERPL-MCNC: 231 UG/DL
UROBILINOGEN URINE: 0.2 MG/DL
WBC # FLD AUTO: 10.16 K/UL
WHITE BLOOD CELLS URINE: 46 /HPF

## 2025-08-29 PROCEDURE — 99204 OFFICE O/P NEW MOD 45 MIN: CPT

## 2025-08-29 RX ORDER — SODIUM BICARBONATE 650 MG/1
650 TABLET ORAL 3 TIMES DAILY
Qty: 90 | Refills: 6 | Status: ACTIVE | COMMUNITY
Start: 2025-08-29 | End: 1900-01-01

## 2025-08-29 RX ORDER — IRON/IRON ASP GLY/FA/MV-MIN 38 125-25-1MG
TABLET ORAL
Refills: 0 | Status: ACTIVE | COMMUNITY

## 2025-09-02 ENCOUNTER — APPOINTMENT (OUTPATIENT)
Dept: OBGYN | Facility: CLINIC | Age: 50
End: 2025-09-02

## 2025-09-03 ENCOUNTER — APPOINTMENT (OUTPATIENT)
Dept: UROLOGY | Facility: CLINIC | Age: 50
End: 2025-09-03
Payer: MEDICAID

## 2025-09-03 VITALS
WEIGHT: 154 LBS | HEART RATE: 100 BPM | OXYGEN SATURATION: 99 % | TEMPERATURE: 97.7 F | HEIGHT: 60 IN | DIASTOLIC BLOOD PRESSURE: 68 MMHG | BODY MASS INDEX: 30.23 KG/M2 | SYSTOLIC BLOOD PRESSURE: 103 MMHG

## 2025-09-03 DIAGNOSIS — N18.4 CHRONIC KIDNEY DISEASE, STAGE 4 (SEVERE): ICD-10-CM

## 2025-09-03 PROCEDURE — 99205 OFFICE O/P NEW HI 60 MIN: CPT

## 2025-09-05 LAB — BACTERIA UR CULT: NORMAL
